# Patient Record
Sex: FEMALE | Race: WHITE | NOT HISPANIC OR LATINO | Employment: FULL TIME | URBAN - METROPOLITAN AREA
[De-identification: names, ages, dates, MRNs, and addresses within clinical notes are randomized per-mention and may not be internally consistent; named-entity substitution may affect disease eponyms.]

---

## 2017-06-08 DIAGNOSIS — O98.511: Primary | ICD-10-CM

## 2017-06-20 ENCOUNTER — OFFICE VISIT (OUTPATIENT)
Dept: MATERNAL FETAL MEDICINE | Facility: CLINIC | Age: 32
End: 2017-06-20
Payer: MEDICAID

## 2017-06-20 VITALS
HEIGHT: 61 IN | WEIGHT: 115.31 LBS | SYSTOLIC BLOOD PRESSURE: 124 MMHG | BODY MASS INDEX: 21.77 KG/M2 | DIASTOLIC BLOOD PRESSURE: 84 MMHG

## 2017-06-20 DIAGNOSIS — O98.419 CHRONIC HEPATITIS C COMPLICATING PREGNANCY, ANTEPARTUM: ICD-10-CM

## 2017-06-20 DIAGNOSIS — Z36.89 ENCOUNTER FOR FETAL ANATOMIC SURVEY: Primary | ICD-10-CM

## 2017-06-20 DIAGNOSIS — Z36.82 ENCOUNTER FOR (NT) NUCHAL TRANSLUCENCY SCAN: ICD-10-CM

## 2017-06-20 DIAGNOSIS — O98.511: ICD-10-CM

## 2017-06-20 DIAGNOSIS — B18.2 CHRONIC HEPATITIS C COMPLICATING PREGNANCY, ANTEPARTUM: ICD-10-CM

## 2017-06-20 PROCEDURE — 76813 OB US NUCHAL MEAS 1 GEST: CPT | Mod: 26,S$PBB,, | Performed by: OBSTETRICS & GYNECOLOGY

## 2017-06-20 PROCEDURE — 99999 PR PBB SHADOW E&M-EST. PATIENT-LVL II: CPT | Mod: PBBFAC,,, | Performed by: OBSTETRICS & GYNECOLOGY

## 2017-06-20 PROCEDURE — 76801 OB US < 14 WKS SINGLE FETUS: CPT | Mod: 26,S$PBB,, | Performed by: OBSTETRICS & GYNECOLOGY

## 2017-06-20 PROCEDURE — 76801 OB US < 14 WKS SINGLE FETUS: CPT | Mod: PBBFAC | Performed by: OBSTETRICS & GYNECOLOGY

## 2017-06-20 PROCEDURE — 99212 OFFICE O/P EST SF 10 MIN: CPT | Mod: PBBFAC | Performed by: OBSTETRICS & GYNECOLOGY

## 2017-06-20 PROCEDURE — 99203 OFFICE O/P NEW LOW 30 MIN: CPT | Mod: 25,S$PBB,TH, | Performed by: OBSTETRICS & GYNECOLOGY

## 2017-06-20 PROCEDURE — 76813 OB US NUCHAL MEAS 1 GEST: CPT | Mod: PBBFAC | Performed by: OBSTETRICS & GYNECOLOGY

## 2017-06-20 RX ORDER — PNV,CALCIUM 72/IRON/FOLIC ACID 27 MG-1 MG
TABLET ORAL
Refills: 0 | COMMUNITY
Start: 2017-05-25 | End: 2018-08-13

## 2017-06-20 NOTE — PROGRESS NOTES
"Indication  ========    consult: Hep c.    History  ======    General History  Other: Hep c  smoker (1/2 pack per day)  Previous Outcomes   2  Para 1  Rene children born living (T) 1  Rene children born (T) 1  Rene living children (L) 1    Maternal Assessment  =================    Weight 52 kg  Weight (lb) 115 lb  BP syst 124 mmHg  BP diast 84 mmHg    Method  ======    Transabdominal ultrasound examination. View: Good view.    Pregnancy  =========    Rene pregnancy. Number of fetuses: 1.    Dating  ======    Cycle: regular cycle  GA by "stated dating" 12 w + 4 d  LORIE by "stated dating": 2017  Ultrasound examination on: 2017  GA by U/S based upon: CRL  GA by U/S 12 w + 6 d  LORIE by U/S: 2017  Assigned: Dating performed on 2017, based on the external assessment  Assigned GA 12 w + 4 d  Assigned LORIE: 2017    General Evaluation  ==============    Cardiac activity: present.  Cord vessels: 3 vessel cord.  Amniotic fluid: normal amount.    Fetal Biometry  ============    CRL 66.0 mm 12w 6d Hadlock  NT 1.2 mm   bpm    Fetal Anatomy  ============    The following structures appear normal:  Cranium. Neck. Thorax. Abdominal wall.    The following structures were visualized:  GI tract. Urogenital tract. Arms. Legs.    Maternal Structures  ===============    Uterus / Cervix  Uterus: Normal  Ovaries / Tubes / Adnexa  Rt ovary: Visualized  Rt ovary D1 2.0 cm  Rt ovary D2 1.7 cm  Rt ovary D3 1.4 cm  Rt ovary mean 1.7 cm  Rt ovary vol 2.5 cm³  Lt ovary: Visualized  Lt ovary D1 2.7 cm  Lt ovary D2 3.0 cm  Lt ovary D3 2.0 cm  Lt ovary mean 2.6 cm  Lt ovary vol 8.7 cm³  Lt ovarian corpus luteum D1 12.0 mm  Lt ovarian corpus luteum D2 9.0 mm  Lt ovarian corpus luteum D3 14.0 mm  Pouch of Edwin / Other Structures  Cul de Sac: Visualized    Consultation  ==========    Gillian is a 31-year-old para 1001 currently at 12 weeks and 4 days gestation with a due date of  " whom we are seen in  consultation at the request of Jon Andersen NP because she was recently discovered to be positive for hepatitis C. she reports that she has  never previously been told that she had hepatitis C and thus this is a new diagnosis. She denies any history of IV drug use or sharing needles.  She has never had a blood transfusion. She has never had a partner that she knows of who is hepatitis C positive. All of her other STD  screening tests were negative including a negative HIV. She does have positive serology for CMV indicating past exposure. Her last pregnancy  was in 2001 and was uncomplicated. She has never seen a GI doctor. She has not had any significant change in nausea or vomiting. She has  never noticed any discoloration of her sclera consistent with jaundice. She has noticed even prior to pregnancy some red spots on her chest  and forearms which appear consistent with small spider angioma. Her past medical history is otherwise notable only for a prior  cholecystectomy.    She is currently a smoker and smokes approximately a half pack per day which is decreased from prior to pregnancy. I encouraged her to  continue to work on decreasing her smoking and I told her that there were a number of resources available including the [place] quit line.  She admits to a past history of inhaled drug use but denies current use. There is no family history of birth defects. She had negative cell free  DNA screening for aneuploidy. The NT measured 1.1 mm at a CRL of 72.5 mm.    I overall spent approximately 40 minutes in face-to-face time with Gillian and her significant other greater than 50% of which were spent in  counseling time regarding the diagnosis of hepatitis C, aneuploidy screening, and smoking cessation. We reviewed her hepatitis C positive  serology as well as the fact that her quantitative PCR identified approximately 140,000 copies of hepatitis C RNA. I also reviewed with her that  the  laboratory studies that were ordered previously revealed a mild elevation of her ALT at 97, but fortunately her total bilirubin was normal at  0.4, her SGOT was normal at 32, her alkaline phosphatase was normal at 60, and her albumin was normal at 4.6. I explained that this may be  indicative of a mild degree of inflammation related to the hepatitis C activity but that in general most women with hepatitis C have improvement  in their LFTs during pregnancy. I also reviewed with her that on exam she has no evidence of icteric sclera or bruising that would suggest other  liver dysfunction. I explained to her the importance of avoiding any potential hepatotoxic agents during pregnancy including medications, such  as acetaminophen, alcohol, or other illicit drugs.    Most women with hepatitis C do well during pregnancy. I explained that in general the pregnancy outcome from most women with hepatitis C  can be favorable although there is an increased risk of adverse outcomes especially related to fetal growth issues and  birth. The extent  to which this is due to hepatitis C versus other exposures such as tobacco use is controversy all however. I explained to her that tobacco use  contributes to both prematurity and fetal growth problems and therefore further encouraged her to quit. One study reported an increased risk of  infants with low birthweight, small for gestational age, need for assisted ventilation, or requirement for  intensive care. HCV-positive  mothers may be at risk for an increased risk of gestational diabetes but most of this risk seems to be limited to those who have significant  hepatic dysfunction.    Improvement in serum aminotransferase concentrations occurs during pregnancy, therefore serial LFTs are not indicated in otherwise stable  patients with Hepatitis C. Although antiviral treatment has been shown effective in eradicating HCV and reducing viral replication, treatment  is  contraindicated during pregnancy. She has never previously seen hepatologist, but I would recommend trying to get her appointed to see one  while she is pregnant so that during the postpartum. There is a plan for follow-up and she can be considered for therapy. The patient was  counseled that the risk of vertical transmission of the virus may occur, but appears to be much less efficient than for hepatitis B, occurring in  about 5 to 10 percent of infants born to women with Hepatitis C. The highest risk seems to be in those with active viral replication, but there is  no role for serial Hepatitis C viral quantification. The risk of infection is approximately threefold higher in infants born to women co-infected with  HCV and HIV. There is no role for elective  in reducing the risk of vertical transmission and there is no reason for any alteration in  usual intrapartum management except for the avoidance of placing a fetal scalp electrode and less absolutely required intrapartum.    Because of the increase in the risk of fetal growth restriction I do recommend serial fetal growth scans beginning at approximately 26 weeks of  gestation and repeated every 4-6 weeks thereafter. I also explained to her that her cell free DNA screening for aneuploidy does not exclude all  congenital malformations and therefore recommend a comprehensive ultrasound evaluation at 18-20 weeks. In addition a maternal serum AFP  without the whole quad screen can be considered at 16-20 weeks to screen for neural tube defects and ventral wall defects.        Impression  =========    Rene living IUP present. Fetal size is consistent with prior dating.    Recommendation  ==============    1. We recommend repeat evaluation for anatomy survey in 6-7 weeks.  2. Serial growth scans beginning at 26 weeks.  3. Referral to GI/hepatology  4. No need for additional LFT or HCV viral count surveillance.  5. No role for elective   6.  Smoking cessation  7. Avoidance of hepatotoxic agents.    Hossein Pfeiffer Jr., MD  Maternal Fetal Medicine

## 2017-06-20 NOTE — LETTER
June 20, 2017      Jon Andersen NP  [address]  [address]  [place] [address]           [facility] - Maternal Fetal [facility]  [address]  [place] [address]  Phone: [phone]          Patient: Gillian Bains   MR Number: [id number]   YOB: 1985   Date of Visit: 6/20/2017       Dear Jon Andersen:    Thank you for referring Gillian Bains to me for evaluation. Attached you will find relevant portions of my assessment and plan of care.    If you have questions, please do not hesitate to call me. I look forward to following Gillian Bains along with you.    Sincerely,    Hossein Pfeiffer MD    Enclosure  CC:  No Recipients    If you would like to receive this communication electronically, please contact [email or web address] or [phone] to request more information on "[employer] Link access.    For providers and/or their staff who would like to refer a patient to [facility], please contact us through our one-stop-shop provider referral line, [facility], at [phone].    If you feel you have received this communication in error or would no longer like to receive these types of communications, please e-mail [email or web address]

## 2017-08-16 ENCOUNTER — OFFICE VISIT (OUTPATIENT)
Dept: MATERNAL FETAL MEDICINE | Facility: CLINIC | Age: 32
End: 2017-08-16
Payer: MEDICAID

## 2017-08-16 DIAGNOSIS — Z36.89 ENCOUNTER FOR ULTRASOUND TO CHECK FETAL GROWTH: Primary | ICD-10-CM

## 2017-08-16 DIAGNOSIS — Z36.89 ULTRASOUND SCAN TO EVALUATE PLACENTA LOCATION: ICD-10-CM

## 2017-08-16 DIAGNOSIS — Z36.89 ENCOUNTER FOR FETAL ANATOMIC SURVEY: ICD-10-CM

## 2017-08-16 PROCEDURE — 76805 OB US >/= 14 WKS SNGL FETUS: CPT | Mod: 26,S$PBB,, | Performed by: OBSTETRICS & GYNECOLOGY

## 2017-08-16 PROCEDURE — 76817 TRANSVAGINAL US OBSTETRIC: CPT | Mod: PBBFAC | Performed by: OBSTETRICS & GYNECOLOGY

## 2017-08-16 PROCEDURE — 99499 UNLISTED E&M SERVICE: CPT | Mod: S$PBB,,, | Performed by: OBSTETRICS & GYNECOLOGY

## 2017-08-16 PROCEDURE — 76817 TRANSVAGINAL US OBSTETRIC: CPT | Mod: 26,S$PBB,, | Performed by: OBSTETRICS & GYNECOLOGY

## 2017-08-16 PROCEDURE — 76805 OB US >/= 14 WKS SNGL FETUS: CPT | Mod: PBBFAC | Performed by: OBSTETRICS & GYNECOLOGY

## 2017-08-16 NOTE — LETTER
August 16, 2017      Hossein Pfeiffer MD  [address] [address]           [facility] - Maternal Fetal Med  [address] [place] [address]  Phone: [phone]          Patient: Gillian Bains   MR Number: [id number]   YOB: 1985   Date of Visit: 8/16/2017       Dear Dr. Hossein Pfeiffer:    Thank you for referring Gillian Bains to me for evaluation. Attached you will find relevant portions of my assessment and plan of care.    If you have questions, please do not hesitate to call me. I look forward to following Gillian Bains along with you.    Sincerely,    Shey Dorman MD    Enclosure  CC:  No Recipients    If you would like to receive this communication electronically, please contact [email or web address] or [phone] to request more information on [employer] Link access.    For providers and/or their staff who would like to refer a patient to [facility], please contact us through our one-stop-shop provider referral line, [facility], at [phone].    If you feel you have received this communication in error or would no longer like to receive these types of communications, please e-mail [email or web address]

## 2017-08-17 NOTE — PROGRESS NOTES
"Indication  ========    Fetal anatomy survey.    History  ======    General History  Other: Hep c  smoker (1/2 pack per day)  Previous Outcomes   2  Para 1  Rene children born living (T) 1  Rene children born (T) 1  Rene living children (L) 1    Method  ======    Transabdominal and transvaginal ultrasound examination. View: Sufficient.    Pregnancy  =========    Rene pregnancy. Number of fetuses: 1.    Dating  ======    Cycle: regular cycle  GA by "stated dating" 20 w + 5 d  LORIE by "stated dating": 2017  Ultrasound examination on: 2017  GA by U/S based upon: AC, BPD, Femur, HC  GA by U/S 21 w + 2 d  LORIE by U/S: 2017  Assigned: Dating performed on 2017, based on the external assessment  Assigned GA 20 w + 5 d  Assigned LORIE: 2017    General Evaluation  ==============    Cardiac activity: present.  bpm.  Fetal movements: visualized.  Presentation: breech.  Placenta: posterior.  Umbilical cord: 3 vessel cord, normal.  Amniotic fluid: normal amount.    Fetal Biometry  ============    Fetal Biometry  BPD 47.5 mm 20w 3d Hadlock  OFD 68.1 mm 22w 5d Marlon  .2 mm 21w 1d Hadlock  .6 mm 22w 0d Hadlock  Femur 36.4 mm 21w 4d Hadlock  Cerebellum tr 23.5 mm 22w 4d Hall  CM 4.9 mm  Nuchal fold 4.53 mm  Humerus 30.1 mm 19w 6d Marlon   g 52% Jai  Calculated by: Hadlock (BPD-HC-AC-FL)  EFW (lb) 1 lb  EFW (oz) 0 oz  Cephalic index 0.70  HC / AC 1.12  FL / BPD 0.77  FL / AC 0.21   bpm  Head / Face / Neck   4.8 mm    Fetal Anatomy  ============    Cranium: normal  Midline falx: normal  Cavum septi pellucidi: normal  Cerebellum: normal  Cisterna magna: normal  Head shape: normal  Rt lateral ventricle: normal  Lt lateral ventricle: normal  Rt choroid plexus: normal  Lt choroid plexus: normal  Parenchyma: normal  Third ventricle: normal  Posterior fossa: normal  Cerebellar lobes: normal  Vermis: normal  Neck: appears normal  Nuchal " fold: normal  Lips: normal  Profile: normal  Nose: normal  Maxilla: normal  Mandible: normal  4-chamber view: normal  RVOT: normal  LVOT: suboptimal  Heart / Thorax: Septal views: normal  Situs: normal  Aortic arch: normal  Ductal arch: normal  SVC: normal  IVC: normal  3-vessel view: normal  3-vessel-trachea view: normal  Cardiac position: normal  Cardiac axis: normal  Cardiac size: normal  Cardiac rhythm: normal  Rt lung: normal  Lt lung: normal  Diaphragm: normal  Cord insertion: normal  Stomach: normal  Bladder: normal  Abdom. wall: appears normal  Abdom. cavity: normal  Rt kidney: normal  Lt kidney: normal  Liver: normal  Bowel: normal  Cervical spine: normal  Thoracic spine: normal  Lumbar spine: normal  Sacral spine: normal  Rt arm: normal  Lt arm: normal  Rt hand: normal  Lt hand: normal  Rt leg: normal  Lt leg: normal  Rt foot: normal  Lt foot: normal  Position of hands: normal  Position of feet: normal  Gender: male  Wants to know gender: yes    Maternal Structures  ===============    Uterus / Cervix  Cervical length 34.3 mm  Other: Uterus and adnexa normal    Impression  =========    Rene live intrauterine pregnancy.  Biometry agrees with the clinical dating.  Amniotic fluid volume is normal by qualitative assessment.  Some of the anatomy was suboptimally visualized. The anatomy that was seen appeared normal.  The placental cord insertion was suboptimally visualized.  Placenta was posterior without previa.  Transvaginal cervical length is normal and placenta is over 2cm from the internal cervical os.    Recommendation  ==============    Follow up ultrasound in 4 weeks to assess fetal growth and suboptimally visualized fetal anatomy.

## 2017-09-14 ENCOUNTER — OFFICE VISIT (OUTPATIENT)
Dept: MATERNAL FETAL MEDICINE | Facility: CLINIC | Age: 32
End: 2017-09-14
Payer: MEDICAID

## 2017-09-14 VITALS — BODY MASS INDEX: 24.2 KG/M2 | SYSTOLIC BLOOD PRESSURE: 104 MMHG | DIASTOLIC BLOOD PRESSURE: 66 MMHG | WEIGHT: 128.06 LBS

## 2017-09-14 DIAGNOSIS — Z36.89 ENCOUNTER FOR ULTRASOUND TO CHECK FETAL GROWTH: ICD-10-CM

## 2017-09-14 DIAGNOSIS — B18.2 CHRONIC HEPATITIS C WITHOUT HEPATIC COMA: ICD-10-CM

## 2017-09-14 PROCEDURE — 99999 PR PBB SHADOW E&M-EST. PATIENT-LVL II: CPT | Mod: PBBFAC,,, | Performed by: OBSTETRICS & GYNECOLOGY

## 2017-09-14 PROCEDURE — 76816 OB US FOLLOW-UP PER FETUS: CPT | Mod: PBBFAC | Performed by: OBSTETRICS & GYNECOLOGY

## 2017-09-14 PROCEDURE — 76816 OB US FOLLOW-UP PER FETUS: CPT | Mod: 26,S$PBB,, | Performed by: OBSTETRICS & GYNECOLOGY

## 2017-09-14 PROCEDURE — 99499 UNLISTED E&M SERVICE: CPT | Mod: S$PBB,,, | Performed by: OBSTETRICS & GYNECOLOGY

## 2017-09-14 PROCEDURE — 99212 OFFICE O/P EST SF 10 MIN: CPT | Mod: PBBFAC | Performed by: OBSTETRICS & GYNECOLOGY

## 2017-09-14 NOTE — LETTER
September 14, 2017      Jon Andersen NP  [address]  [address]  [place] [address]           [facility] - Maternal Fetal Med  [address]  [facility] [address]  Phone: [phone]          Patient: Gillian Bains   MR Number: [id number]   YOB: 1985   Date of Visit: 9/14/2017       Dear Jon Andersen:    Thank you for referring Gillian Bains to me for evaluation. Attached you will find relevant portions of my assessment and plan of care.    If you have questions, please do not hesitate to call me. I look forward to following Gillian Bains along with you.    Sincerely,    Shey Dorman MD    Enclosure  CC:  No Recipients    If you would like to receive this communication electronically, please contact [email or web address] or [phone] to request more information on [employer] Link access.    For providers and/or their staff who would like to refer a patient to [facility], please contact us through our one-stop-shop provider referral line, [facility], at [phone].    If you feel you have received this communication in error or would no longer like to receive these types of communications, please e-mail [email or web address]

## 2017-09-14 NOTE — PROGRESS NOTES
"Indication  ========    Evaluation of fetal growth, Hep C.    History  ======    General History  Other: Hep c  smoker (1/2 pack per day)  Previous Outcomes   2  Para 1  Rene children born living (T) 1  Rene children born (T) 1  Rene living children (L) 1    Maternal Assessment  =================    BP syst 104 mmHg  BP diast 66 mmHg    Method  ======    Transvaginal ultrasound examination, , Voluson E10, Transabdominal ultrasound examination. View: Good view.    Pregnancy  =========    Rene pregnancy. Number of fetuses: 1.    Dating  ======    Cycle: regular cycle  GA by "stated dating" 24 w + 6 d  LORIE by "stated dating": 2017  Ultrasound examination on: 2017  GA by U/S based upon: AC, BPD, Femur, HC  GA by U/S 24 w + 5 d  LORIE by U/S: 2017  Assigned: Dating performed on 2017, based on the external assessment  Assigned GA 24 w + 6 d  Assigned LORIE: 2017    General Evaluation  ==============    Cardiac activity: present.  bpm.  Fetal movements: visualized.  Presentation: breech.  Placenta: posterior.  Umbilical cord: 3 vessel cord.  Amniotic fluid: normal amount.    Fetal Biometry  ============    Fetal Biometry  BPD 59.4 mm 24w 2d Hadlock  OFD 83.5 mm 27w 1d Marlon  .3 mm 25w 0d Hadlock  .6 mm 25w 2d Hadlock  Femur 44.2 mm 24w 4d Hadlock   g 46% Jai  Calculated by: Hadlock (BPD-HC-AC-FL)  EFW (lb) 1 lb  EFW (oz) 10 oz  Cephalic index 0.71  HC / AC 1.11  FL / BPD 0.74  FL / AC 0.21   bpm    Fetal Anatomy  ===========    Cranium: normal  Posterior fossa: normal  4-chamber view: normal  Stomach: normal  Kidneys: normal  Bladder: normal  Gender: male  Wants to know gender: yes  Other: A full anatomy survey previously performed.    Maternal Structures  ===============    Uterus / Cervix  Cervical length 38.9 mm    Impression  =========    Rene live intrauterine pregnancy.  Overall normal fetal growth.  Normal amniotic fluid " volume.  The previously suboptimally visualized fetal anatomy was seen today and appeared normal. The PCI appeared normal.  Transvaginal cervical length was normal.  Patient was informed of ultrasound findings.    Recommendation  ==============    Follow up ultrasound in 6 weeks for growth due to hepatitis C.

## 2017-10-26 ENCOUNTER — OFFICE VISIT (OUTPATIENT)
Dept: MATERNAL FETAL MEDICINE | Facility: CLINIC | Age: 32
End: 2017-10-26
Payer: MEDICAID

## 2017-10-26 DIAGNOSIS — Z36.89 ENCOUNTER FOR ULTRASOUND TO CHECK FETAL GROWTH: ICD-10-CM

## 2017-10-26 PROCEDURE — 99499 UNLISTED E&M SERVICE: CPT | Mod: S$PBB,,, | Performed by: OBSTETRICS & GYNECOLOGY

## 2017-10-26 PROCEDURE — 76816 OB US FOLLOW-UP PER FETUS: CPT | Mod: 26,S$PBB,, | Performed by: OBSTETRICS & GYNECOLOGY

## 2017-10-26 PROCEDURE — 76816 OB US FOLLOW-UP PER FETUS: CPT | Mod: PBBFAC | Performed by: OBSTETRICS & GYNECOLOGY

## 2017-10-26 NOTE — LETTER
October 27, 2017      Jon Andersen NP  [address]  [address]  [place] [address]           [facility] - Maternal Fetal Med  [address]  [facility] [address]  Phone: [phone]          Patient: Gillian Bains   MR Number: [id number]   YOB: 1985   Date of Visit: 10/26/2017       Dear Jon Andersen:    Thank you for referring Gillian Bains to me for evaluation. Attached you will find relevant portions of my assessment and plan of care.    If you have questions, please do not hesitate to call me. I look forward to following Gillian Bains along with you.    Sincerely,    Chari Sifuentes MD    Enclosure  CC:  No Recipients    If you would like to receive this communication electronically, please contact [email or web address] or [phone] to request more information on [employer] Link access.    For providers and/or their staff who would like to refer a patient to [facility], please contact us through our one-stop-shop provider referral line, [facility], at [phone].    If you feel you have received this communication in error or would no longer like to receive these types of communications, please e-mail [email or web address]

## 2017-10-27 NOTE — PROGRESS NOTES
"Indication  ========    Follow-up evaluation for fetal growth, HEP C.    History  ======    General History  Other: Hep c  smoker (1/2 pack per day)    [facility]. [facility],  Previous Outcomes   2  Para 1  Rene children born living (T) 1  Rene children born (T) 1  Rene living children (L) 1    Method  ======    Transabdominal ultrasound examination. View: Sufficient.    Pregnancy  =========    Rene pregnancy. Number of fetuses: 1.    Dating  ======    Cycle: regular cycle  GA by "stated dating" 30 w + 6 d  LORIE by "stated dating": 2017  Ultrasound examination on: 10/26/2017  GA by U/S based upon: AC, BPD, Femur, HC  GA by U/S 31 w + 5 d  LORIE by U/S: 2017  Assigned: Dating performed on 2017, based on the external assessment  Assigned GA 30 w + 6 d  Assigned LORIE: 2017    General Evaluation  ==============    Cardiac activity: present.  bpm.  Fetal movements: visualized.  Presentation: cephalic.  Placenta:  Placental site: posterior.  Umbilical cord: Cord vessels: 3 vessel cord.  Amniotic fluid: Amount of AF: normal amount. MVP 4.6 cm.    Fetal Biometry  ============    Fetal Biometry  BPD 79.3 mm 31w 6d Hadlock  .4 mm 33w 5d Marlon  .2 mm 32w 2d Hadlock  .6 mm 31w 5d Hadlock  Femur 59.4 mm 31w 0d Hadlock  EFW 1,791 g 43% Jai  Calculated by: Hadlock (BPD-HC-AC-FL)  EFW (lb) 3 lb  EFW (oz) 15 oz  Cephalic index 0.77  HC / AC 1.06  FL / BPD 0.75  FL / AC 0.21  MVP 4.6 cm   bpm    Fetal Anatomy  ===========    Cranium: normal  Profile: normal  4-chamber view: normal  LVOT: normal  Stomach: normal  Kidneys: normal  Bladder: normal  Genitals: normal  Gender: male  Wants to know gender: yes    Impression  =========    The interval fetal growth has been appropriate and the EFW plots at the 43rd percentile for gestational age.  The AFV is normal.  The previously suboptimally visualized anatomy was seen " today.    Recommendation  ==============    Recommend repeat growth scan in 4 weeks due to maternal hepatitis C.

## 2017-11-30 ENCOUNTER — OFFICE VISIT (OUTPATIENT)
Dept: MATERNAL FETAL MEDICINE | Facility: CLINIC | Age: 32
End: 2017-11-30
Payer: MEDICAID

## 2017-11-30 DIAGNOSIS — Z36.89 ENCOUNTER FOR ULTRASOUND TO CHECK FETAL GROWTH: ICD-10-CM

## 2017-11-30 DIAGNOSIS — O98.413: ICD-10-CM

## 2017-11-30 DIAGNOSIS — B18.2: ICD-10-CM

## 2017-11-30 PROCEDURE — 99499 UNLISTED E&M SERVICE: CPT | Mod: S$PBB,,, | Performed by: OBSTETRICS & GYNECOLOGY

## 2017-11-30 PROCEDURE — 76816 OB US FOLLOW-UP PER FETUS: CPT | Mod: PBBFAC | Performed by: OBSTETRICS & GYNECOLOGY

## 2017-11-30 PROCEDURE — 76816 OB US FOLLOW-UP PER FETUS: CPT | Mod: 26,S$PBB,, | Performed by: OBSTETRICS & GYNECOLOGY

## 2017-11-30 NOTE — PROGRESS NOTES
"OB Ultrasound Report (see PDF version under imaging tab)    Indication  ========    Follow-up evaluation for fetal growth; maternal hepatitis C.    History  ======    General History  Other: Hep c  smoker (1/2 pack per day)    Ms. Jon Andersen, CNP,  Previous Outcomes   2  Para 1  Rene children born living (T) 1  Rene children born (T) 1  Rene living children (L) 1    Method  ======    Transabdominal ultrasound examination. View: Good view.    Pregnancy  =========    Rene pregnancy. Number of fetuses: 1.    Dating  ======    Cycle: regular cycle  GA by "stated dating" 35 w + 6 d  LORIE by "stated dating": 2017  Ultrasound examination on: 2017  GA by U/S based upon: AC, BPD, Femur, HC  GA by U/S 35 w + 5 d  LORIE by U/S: 2017  Assigned: Dating performed on 2017, based on the external assessment  Assigned GA 35 w + 6 d  Assigned LORIE: 2017    General Evaluation  ===============    Cardiac activity: present.  bpm.  Fetal movements: visualized.  Presentation: cephalic.  Placenta:  Placental site: posterior.  Umbilical cord: Cord vessels: 3 vessel cord.  Amniotic fluid: MVP 5.5 cm.    Fetal Biometry  ===========    Fetal Biometry  BPD 84.8 mm 34w 1d Hadlock  .0 mm  .5 mm 36w 5d Hadlock  .3 mm 36w 4d Hadlock  Femur 69.2 mm 35w 4d Hadlock  CM 5.1 mm  EFW 2,832 g 40% Jai  Calculated by: Hadlock (BPD-HC-AC-FL)  EFW (lb) 6 lb  EFW (oz) 4 oz  Cephalic index 0.73  HC / AC 0.99  FL / BPD 0.82  FL / AC 0.21  MVP 5.5 cm   bpm  Head / Face / Neck   3.1 mm    Fetal Anatomy  ===========    Cranium: normal  Lateral ventricles: normal  Profile: normal  4-chamber view: normal  Stomach: normal  Kidneys: normal  Bladder: normal  Gender: male  Wants to know gender: yes  Other: A full anatomic survey has been previously performed.    Impression  =========    Fetal size is AGA with the EFW at the 40th percentile.  Normal repeat limited fetal " anatomic survey. AFV is normal. Follow-up ultrasound as clinically indicated.

## 2017-12-18 ENCOUNTER — ANESTHESIA (OUTPATIENT)
Dept: OBSTETRICS AND GYNECOLOGY | Facility: OTHER | Age: 32
End: 2017-12-18
Payer: MEDICAID

## 2017-12-18 ENCOUNTER — ANESTHESIA EVENT (OUTPATIENT)
Dept: OBSTETRICS AND GYNECOLOGY | Facility: OTHER | Age: 32
End: 2017-12-18
Payer: MEDICAID

## 2017-12-18 ENCOUNTER — HOSPITAL ENCOUNTER (INPATIENT)
Facility: OTHER | Age: 32
LOS: 2 days | Discharge: HOME | End: 2017-12-20
Attending: OBSTETRICS & GYNECOLOGY | Admitting: OBSTETRICS & GYNECOLOGY
Payer: MEDICAID

## 2017-12-18 DIAGNOSIS — Z86.19 HISTORY OF HEPATITIS C: ICD-10-CM

## 2017-12-18 DIAGNOSIS — Z87.59 STATUS POST VACUUM-ASSISTED VAGINAL DELIVERY: ICD-10-CM

## 2017-12-18 DIAGNOSIS — O47.9 UTERINE CONTRACTIONS DURING PREGNANCY: ICD-10-CM

## 2017-12-18 DIAGNOSIS — O99.333 PREGNANCY COMPLICATED BY TOBACCO USE IN THIRD TRIMESTER: ICD-10-CM

## 2017-12-18 DIAGNOSIS — Z3A.38 38 WEEKS GESTATION OF PREGNANCY: ICD-10-CM

## 2017-12-18 DIAGNOSIS — F19.90: ICD-10-CM

## 2017-12-18 LAB
ABO + RH BLD: NORMAL
ALLENS TEST: ABNORMAL
AMPHET+METHAMPHET UR QL: NEGATIVE
ANISOCYTOSIS BLD QL SMEAR: SLIGHT
BARBITURATES UR QL SCN>200 NG/ML: NEGATIVE
BASOPHILS # BLD AUTO: 0.02 K/UL
BASOPHILS NFR BLD: 0.2 %
BENZODIAZ UR QL SCN>200 NG/ML: NEGATIVE
BLD GP AB SCN CELLS X3 SERPL QL: NORMAL
BZE UR QL SCN: NEGATIVE
CANNABINOIDS UR QL SCN: NEGATIVE
CREAT UR-MCNC: 41.5 MG/DL
DIFFERENTIAL METHOD: ABNORMAL
EOSINOPHIL # BLD AUTO: 0.1 K/UL
EOSINOPHIL NFR BLD: 0.7 %
ERYTHROCYTE [DISTWIDTH] IN BLOOD BY AUTOMATED COUNT: 13.1 %
ETHANOL UR-MCNC: <10 MG/DL
GIANT PLATELETS BLD QL SMEAR: PRESENT
HCO3 UR-SCNC: 23.7 MMOL/L (ref 24–28)
HCT VFR BLD AUTO: 36.2 %
HGB BLD-MCNC: 12.5 G/DL
LYMPHOCYTES # BLD AUTO: 2.6 K/UL
LYMPHOCYTES NFR BLD: 21.9 %
MCH RBC QN AUTO: 31.1 PG
MCHC RBC AUTO-ENTMCNC: 34.5 G/DL
MCV RBC AUTO: 90 FL
METHADONE UR QL SCN>300 NG/ML: NEGATIVE
MONOCYTES # BLD AUTO: 0.5 K/UL
MONOCYTES NFR BLD: 4.4 %
NEUTROPHILS # BLD AUTO: 8.5 K/UL
NEUTROPHILS NFR BLD: 72.8 %
OPIATES UR QL SCN: NORMAL
PCO2 BLDA: 50.7 MMHG (ref 35–45)
PCP UR QL SCN>25 NG/ML: NEGATIVE
PH SMN: 7.28 [PH] (ref 7.35–7.45)
PLATELET # BLD AUTO: 379 K/UL
PLATELET BLD QL SMEAR: ABNORMAL
PMV BLD AUTO: 9.6 FL
PO2 BLDA: 23 MMHG (ref 80–100)
POC BE: -3 MMOL/L
POC SATURATED O2: 31 % (ref 95–100)
RBC # BLD AUTO: 4.02 M/UL
RPR SER QL: NORMAL
SAMPLE: ABNORMAL
SITE: ABNORMAL
TOXICOLOGY INFORMATION: NORMAL
WBC # BLD AUTO: 11.74 K/UL

## 2017-12-18 PROCEDURE — 27800517 HC TRAY,EPIDURAL-CONTINUOUS: Performed by: ANESTHESIOLOGY

## 2017-12-18 PROCEDURE — 59025 FETAL NON-STRESS TEST: CPT

## 2017-12-18 PROCEDURE — 72100002 HC LABOR CARE, 1ST 8 HOURS

## 2017-12-18 PROCEDURE — 25000003 PHARM REV CODE 250: Performed by: STUDENT IN AN ORGANIZED HEALTH CARE EDUCATION/TRAINING PROGRAM

## 2017-12-18 PROCEDURE — 86592 SYPHILIS TEST NON-TREP QUAL: CPT

## 2017-12-18 PROCEDURE — 99285 EMERGENCY DEPT VISIT HI MDM: CPT | Mod: 25

## 2017-12-18 PROCEDURE — 59025 FETAL NON-STRESS TEST: CPT | Mod: 26,,, | Performed by: OBSTETRICS & GYNECOLOGY

## 2017-12-18 PROCEDURE — 51702 INSERT TEMP BLADDER CATH: CPT

## 2017-12-18 PROCEDURE — 85025 COMPLETE CBC W/AUTO DIFF WBC: CPT

## 2017-12-18 PROCEDURE — 62326 NJX INTERLAMINAR LMBR/SAC: CPT | Performed by: ANESTHESIOLOGY

## 2017-12-18 PROCEDURE — 59409 OBSTETRICAL CARE: CPT | Mod: AA,,, | Performed by: ANESTHESIOLOGY

## 2017-12-18 PROCEDURE — 82803 BLOOD GASES ANY COMBINATION: CPT

## 2017-12-18 PROCEDURE — 99283 EMERGENCY DEPT VISIT LOW MDM: CPT | Mod: 25,,, | Performed by: OBSTETRICS & GYNECOLOGY

## 2017-12-18 PROCEDURE — 63600175 PHARM REV CODE 636 W HCPCS: Performed by: ANESTHESIOLOGY

## 2017-12-18 PROCEDURE — 99900035 HC TECH TIME PER 15 MIN (STAT)

## 2017-12-18 PROCEDURE — 11000001 HC ACUTE MED/SURG PRIVATE ROOM

## 2017-12-18 PROCEDURE — 86901 BLOOD TYPING SEROLOGIC RH(D): CPT

## 2017-12-18 PROCEDURE — 86703 HIV-1/HIV-2 1 RESULT ANTBDY: CPT

## 2017-12-18 PROCEDURE — 25000003 PHARM REV CODE 250: Performed by: ANESTHESIOLOGY

## 2017-12-18 PROCEDURE — 86900 BLOOD TYPING SEROLOGIC ABO: CPT

## 2017-12-18 PROCEDURE — 36416 COLLJ CAPILLARY BLOOD SPEC: CPT

## 2017-12-18 PROCEDURE — 63600175 PHARM REV CODE 636 W HCPCS: Performed by: STUDENT IN AN ORGANIZED HEALTH CARE EDUCATION/TRAINING PROGRAM

## 2017-12-18 PROCEDURE — 59409 OBSTETRICAL CARE: CPT | Mod: AT,,, | Performed by: OBSTETRICS & GYNECOLOGY

## 2017-12-18 PROCEDURE — 80307 DRUG TEST PRSMV CHEM ANLYZR: CPT

## 2017-12-18 PROCEDURE — 27200710 HC EPIDURAL INFUSION PUMP SET: Performed by: ANESTHESIOLOGY

## 2017-12-18 RX ORDER — IBUPROFEN 600 MG/1
600 TABLET ORAL EVERY 6 HOURS PRN
Status: DISCONTINUED | OUTPATIENT
Start: 2017-12-18 | End: 2017-12-19

## 2017-12-18 RX ORDER — FENTANYL/BUPIVACAINE/NS/PF 2MCG/ML-.1
PLASTIC BAG, INJECTION (ML) INJECTION CONTINUOUS
Status: DISCONTINUED | OUTPATIENT
Start: 2017-12-18 | End: 2017-12-20 | Stop reason: HOSPADM

## 2017-12-18 RX ORDER — METHYLERGONOVINE MALEATE 0.2 MG/ML
INJECTION INTRAVENOUS
Status: DISCONTINUED
Start: 2017-12-18 | End: 2017-12-19 | Stop reason: WASHOUT

## 2017-12-18 RX ORDER — SODIUM CITRATE AND CITRIC ACID MONOHYDRATE 334; 500 MG/5ML; MG/5ML
30 SOLUTION ORAL ONCE
Status: DISCONTINUED | OUTPATIENT
Start: 2017-12-18 | End: 2017-12-20 | Stop reason: HOSPADM

## 2017-12-18 RX ORDER — BUPIVACAINE HYDROCHLORIDE 2.5 MG/ML
INJECTION, SOLUTION EPIDURAL; INFILTRATION; INTRACAUDAL
Status: DISPENSED
Start: 2017-12-18 | End: 2017-12-19

## 2017-12-18 RX ORDER — LIDOCAINE HYDROCHLORIDE AND EPINEPHRINE 15; 5 MG/ML; UG/ML
INJECTION, SOLUTION EPIDURAL
Status: DISCONTINUED | OUTPATIENT
Start: 2017-12-18 | End: 2017-12-18

## 2017-12-18 RX ORDER — CARBOPROST TROMETHAMINE 250 UG/ML
INJECTION, SOLUTION INTRAMUSCULAR
Status: DISCONTINUED
Start: 2017-12-18 | End: 2017-12-19 | Stop reason: WASHOUT

## 2017-12-18 RX ORDER — ONDANSETRON 8 MG/1
8 TABLET, ORALLY DISINTEGRATING ORAL EVERY 8 HOURS PRN
Status: DISCONTINUED | OUTPATIENT
Start: 2017-12-18 | End: 2017-12-19

## 2017-12-18 RX ORDER — OXYTOCIN/RINGER'S LACTATE 20/1000 ML
2 PLASTIC BAG, INJECTION (ML) INTRAVENOUS CONTINUOUS
Status: DISCONTINUED | OUTPATIENT
Start: 2017-12-18 | End: 2017-12-19

## 2017-12-18 RX ORDER — SODIUM CHLORIDE, SODIUM LACTATE, POTASSIUM CHLORIDE, CALCIUM CHLORIDE 600; 310; 30; 20 MG/100ML; MG/100ML; MG/100ML; MG/100ML
INJECTION, SOLUTION INTRAVENOUS CONTINUOUS
Status: DISCONTINUED | OUTPATIENT
Start: 2017-12-18 | End: 2017-12-19

## 2017-12-18 RX ORDER — MISOPROSTOL 200 UG/1
600 TABLET ORAL
Status: DISCONTINUED | OUTPATIENT
Start: 2017-12-18 | End: 2017-12-19

## 2017-12-18 RX ORDER — FAMOTIDINE 10 MG/ML
20 INJECTION INTRAVENOUS ONCE
Status: DISCONTINUED | OUTPATIENT
Start: 2017-12-18 | End: 2017-12-20 | Stop reason: HOSPADM

## 2017-12-18 RX ORDER — FENTANYL CITRATE 50 UG/ML
INJECTION, SOLUTION INTRAMUSCULAR; INTRAVENOUS
Status: COMPLETED
Start: 2017-12-18 | End: 2017-12-18

## 2017-12-18 RX ORDER — FENTANYL CITRATE 50 UG/ML
INJECTION, SOLUTION INTRAMUSCULAR; INTRAVENOUS
Status: DISCONTINUED | OUTPATIENT
Start: 2017-12-18 | End: 2017-12-18

## 2017-12-18 RX ORDER — FENTANYL/BUPIVACAINE/NS/PF 2MCG/ML-.1
PLASTIC BAG, INJECTION (ML) INJECTION
Status: DISPENSED
Start: 2017-12-18 | End: 2017-12-19

## 2017-12-18 RX ORDER — FENTANYL/BUPIVACAINE/NS/PF 2MCG/ML-.1
PLASTIC BAG, INJECTION (ML) INJECTION CONTINUOUS PRN
Status: DISCONTINUED | OUTPATIENT
Start: 2017-12-18 | End: 2017-12-18

## 2017-12-18 RX ORDER — SODIUM CHLORIDE 9 MG/ML
INJECTION, SOLUTION INTRAVENOUS
Status: DISCONTINUED | OUTPATIENT
Start: 2017-12-18 | End: 2017-12-19

## 2017-12-18 RX ADMIN — SODIUM CHLORIDE, SODIUM LACTATE, POTASSIUM CHLORIDE, AND CALCIUM CHLORIDE 1000 ML: .6; .31; .03; .02 INJECTION, SOLUTION INTRAVENOUS at 03:12

## 2017-12-18 RX ADMIN — IBUPROFEN 600 MG: 600 TABLET, FILM COATED ORAL at 11:12

## 2017-12-18 RX ADMIN — FENTANYL CITRATE 100 MCG: 50 INJECTION, SOLUTION INTRAMUSCULAR; INTRAVENOUS at 03:12

## 2017-12-18 RX ADMIN — Medication 5 ML: at 03:12

## 2017-12-18 RX ADMIN — LIDOCAINE HYDROCHLORIDE,EPINEPHRINE BITARTRATE 3 ML: 15; .005 INJECTION, SOLUTION EPIDURAL; INFILTRATION; INTRACAUDAL; PERINEURAL at 03:12

## 2017-12-18 RX ADMIN — SODIUM CHLORIDE, SODIUM LACTATE, POTASSIUM CHLORIDE, AND CALCIUM CHLORIDE: .6; .31; .03; .02 INJECTION, SOLUTION INTRAVENOUS at 01:12

## 2017-12-18 RX ADMIN — Medication 2 MILLI-UNITS/MIN: at 03:12

## 2017-12-18 RX ADMIN — Medication 10 ML/HR: at 03:12

## 2017-12-18 NOTE — HOSPITAL COURSE
"12/18/2017 - Patient admitted for expectant labor management.  12/19/2017 - PPD #1 s/p VAVD of male infant. Patient with Hep C, tobacco use during pregnancy, and urine positive for opiates. She is doing well this morning from a PP standpoint. She was asked about opiate use and says she took percocet occasionally ("left over from something else") for pregnancy-related pain. She denies other drug use. Infant's utox negative. She is bottle feeding, and desires circumcision.  12/20/2017 PPD#2 s/p VAVD, meeting all postpartum milestones, stable for dc.  "

## 2017-12-18 NOTE — HPI
Gillian Bains is a 32 y.o.  at 38w3d who presents complaining of painful uterine contractions since 3:30 AM.  The patient states that they have increased in intensity and frequency since that time. She denies any leaking fluids or vaginal bleeding and states that fetal movements are active and persistent.     In the OB ED she was found to have a reactive strip and her cervical check was 4/90/-1.  Given her cervical dilation and painful contractions she was admitted to the floor for expectant labor management.     This IUP is complicated by daily tobacco use, maternal Hep C infection.

## 2017-12-18 NOTE — H&P
[facility]  Obstetrics  History & Physical    Patient Name: Gillian Bains  MRN: [id number]  Admission Date: 2017  Primary Care Provider: Jon Andersen NP    Subjective:     Principal Problem:<principal problem not specified>    History of Present Illness:  Gillian Bains is a 32 y.o.  at 38w3d who presents complaining of painful uterine contractions since 3:30 AM.  The patient states that they have increased in intensity and frequency since that time. She denies any leaking fluids or vaginal bleeding and states that fetal movements are active and persistent.     In the OB ED she was found to have a reactive strip and her cervical check was /-1.  Given her cervical dilation and painful contractions she was admitted to the floor for expectant labor management.     This IUP is complicated by daily tobacco use, maternal Hep C infection.           Obstetric History       T1      L1     SAB0   TAB0   Ectopic0   Multiple0   Live Births1       # Outcome Date GA Lbr [place]/2nd Weight Sex Delivery Anes PTL Lv   2 Current            1 Term  40w0d   M Vag-Spont  Y CORA      Name: Miko        Past Medical History:   Diagnosis Date    Hepatitis C      Past Surgical History:   Procedure Laterality Date    CHOLECYSTECTOMY         PTA Medications   Medication Sig    PREPLUS 27 mg iron- 1 mg Tab TK ONE T PO D       Review of patient's allergies indicates:  No Known Allergies     Family History     None        Social History Main Topics    Smoking status: Current Every Day Smoker     Packs/day: 0.50    Smokeless tobacco: Never Used    Alcohol use No    Drug use: No    Sexual activity: Yes     Partners: Male     Review of Systems   Constitutional: Negative for chills and fever.   Eyes: Negative for visual disturbance.   Respiratory: Negative for shortness of breath.    Cardiovascular: Negative for chest pain.   Gastrointestinal: Positive for abdominal pain (With Contractions).  Negative for diarrhea, nausea and vomiting.   Genitourinary: Negative for dysuria, hematuria, vaginal bleeding and vaginal discharge.   Musculoskeletal: Positive for back pain (With Contractions).   Skin:  Negative for rash.   Neurological: Negative for headaches.   Psychiatric/Behavioral: Negative.       Objective:     VSS    FHT: 130, moderate variability, accels present, no decells, Category 1 - Reassuring  TOCO:  Irregular     Physical Exam:   Constitutional: She is oriented to person, place, and time. She appears well-developed and well-nourished. No distress.    HENT:   Head: Normocephalic and atraumatic.    Eyes: Conjunctivae are normal.    Neck: Neck supple.    Cardiovascular: Normal rate, regular rhythm and intact distal pulses.     Pulmonary/Chest: Effort normal. No respiratory distress.        Abdominal: She exhibits distension (Gravid uterus). There is no rebound and no guarding.                 Neurological: She is alert and oriented to person, place, and time.    Skin: Skin is warm and dry. She is not diaphoretic.    Psychiatric: She has a normal mood and affect. Her behavior is normal. Judgment and thought content normal.       Cervix:  Dilation:  4  Effacement:  90%  Station: -1  Presentation: Vertex     Significant Labs:  No results found for: GROUPTRH, HEPBSAG, RUBELLAIGGSC, STREPBCULT, AFP, [id number]    I have personallly reviewed all pertinent lab results from the last 24 hours.    Assessment/Plan:     32 y.o. female  at 38w3d for:    Indication for care in labor and delivery, antepartum    - Consents signed and to chart  - Admit to Labor and Delivery unit  - Plan for expectant for labor management, will augment as needed    - Epidural per Anesthesia  - Draw CBC, T&S  - Ultrasound performed, fetus in vertex position.  - Recheck in 2 hrs or PRN   - Post-Partum Hemorrhage risk - low                  Blaise Shahid MD  Obstetrics  [facility]    I have reviewed the  resident's note,and agree with the diagnosis and management plan.

## 2017-12-18 NOTE — ED PROVIDER NOTES
"Encounter Date: 2017       History     Chief Complaint   Patient presents with    Contractions     Gillian Bains is a 32 y.o.  at 38w3d who presents complaining of painful uterine contractions since 3:30 AM.  The patient states that they have increased in intensity and frequency since that time. She denies any leaking fluids or vaginal bleeding and states that fetal movements are active and persistent. The patient denies any other complaints at this time apart from those listed above.     This IUP is complicated by daily tobacco use and maternal Hep C infection.            Review of patient's allergies indicates:  No Known Allergies  Past Medical History:   Diagnosis Date    Hepatitis C      Past Surgical History:   Procedure Laterality Date    CHOLECYSTECTOMY       No family history on file.  Social History   Substance Use Topics    Smoking status: Current Every Day Smoker     Packs/day: 0.50    Smokeless tobacco: Never Used    Alcohol use No     Review of Systems   Constitutional: Negative for fever.   HENT: Negative for sore throat.    Eyes: Negative for visual disturbance.   Respiratory: Negative for shortness of breath.    Cardiovascular: Negative for chest pain.   Gastrointestinal: Positive for abdominal pain (Contractions). Negative for diarrhea, nausea and vomiting.   Genitourinary: Negative for dysuria.   Musculoskeletal: Positive for back pain (With Contractions).   Skin: Negative for rash.   Neurological: Negative for weakness and headaches.   Hematological: Does not bruise/bleed easily.       Physical Exam     Vitals:    17 1356 17 1401 17 1406 17 1407   BP:    (!) 108/56   Pulse: 84 83 86 82   Resp:       Temp:       SpO2: 100% 99% 100%    Weight:    62.6 kg (138 lb)   Height:    5' 1" (1.549 m)       Physical Exam    Vitals reviewed.  Constitutional: She appears well-developed and well-nourished. She is not diaphoretic. No distress.   HENT:   Head: " Normocephalic and atraumatic.   Eyes: Conjunctivae are normal.   Neck: Neck supple.   Cardiovascular: Normal rate, regular rhythm and intact distal pulses.   Pulmonary/Chest: Breath sounds normal. No respiratory distress.   Abdominal: She exhibits distension (Gravid uterus). There is no tenderness. There is no rebound and no guarding.   Genitourinary: Vagina normal.   Neurological: She is alert and oriented to person, place, and time.   Skin: Skin is warm and dry.   Psychiatric: She has a normal mood and affect. Her behavior is normal. Judgment and thought content normal.     OB LABOR EXAM:   Pre-Term Labor: No.   Membranes ruptured: No.   Method: Sterile vaginal exam per MD.   Vaginal Bleeding: bloody show.     Dilatation: 4.   Station: -1.   Effacement: 90%.   Amniotic Fluid Color: no fluid.           ED Course   Procedures  Labs Reviewed - No data to display     Fetal Nonstress Test:   FHR: 130s  + accelerations, no declerations; moderate variability  [place]: irregular contractions  Assessment: reactive NST     Medical Decision Making:   ED Management:  SVE - 4/90/-1  NST - Reactive    MDM:  Patient's limited prenatal records in our system were reviewed. She brought with her Prenatal Labs from the NP that she has been seeing for Prenatal care. GBS is negative. HIV, RPR &  Hep B were negative in the first trimester.                  ED Course      Clinical Impression:   The primary encounter diagnosis was Indication for care in labor and delivery, antepartum. Diagnoses of Uterine contractions during pregnancy, 38 weeks gestation of pregnancy, Pregnancy complicated by tobacco use in third trimester, and History of hepatitis C were also pertinent to this visit.     - Will admit patient to L&D for expectant and/or active labor management    Ashlyn Fernandez M.D.  PGY1 OB/GYN          Ashlyn Potts MD  Resident  12/18/17 6567  The patient has been seen and examined by me, and I agree with the Resident assessment and plan  as above.      Audelia Zhu MD  12/18/17 4115

## 2017-12-18 NOTE — PROGRESS NOTES
"LABOR NOTE    Gillian Bains is a 32 y.o. who presented in labor.   S:  Complaints: No.  Epidural working:  Yes.    O: /60   Pulse 93   Temp 97 °F (36.1 °C)   Resp 18   Ht 5' 1" (1.549 m)   Wt 62.6 kg (138 lb)   SpO2 100%   Breastfeeding? No   BMI 26.07 kg/m²     FHT: baseline 135 Cat 1 (reassuring), mod btbv, accelerations, no decels  CTX: q 3-8 minutes  SVE: 5/90/-1, AROM clear, bloody show    ASSESSMENT:   32 y.o.  at 38w3d, latent labor. Will augment with pitocin.    FHT reassuring    Active Hospital Problems    Diagnosis  POA    Uterine contractions during pregnancy [O62.2]  Yes    Indication for care in labor and delivery, antepartum [O75.9]  Unknown      Resolved Hospital Problems    Diagnosis Date Resolved POA   No resolved problems to display.     PLAN:    Latent labor  AROM at 15:30  Continue Close Maternal/Fetal Monitoring  Start pitocin Augmentation per protocol  Recheck 2 hours or PRN    Blaise Shahid M.D.  Obstetrics & Gynecology  PGY-1      "

## 2017-12-18 NOTE — ASSESSMENT & PLAN NOTE
- Consents signed and to chart  - Admit to Labor and Delivery unit  - Plan for expectant for labor management, will augment as needed    - Epidural per Anesthesia  - Draw CBC, T&S  - Ultrasound performed, fetus in vertex position.  - Recheck in 2 hrs or PRN   - Post-Partum Hemorrhage risk - low

## 2017-12-18 NOTE — SUBJECTIVE & OBJECTIVE
Obstetric History       T1      L1     SAB0   TAB0   Ectopic0   Multiple0   Live Births1       # Outcome Date GA Lbr [place]/2nd Weight Sex Delivery Anes PTL Lv   2 Current            1 Term  40w0d   M Vag-Spont  Y CORA      Name: Miko        Past Medical History:   Diagnosis Date    Hepatitis C      Past Surgical History:   Procedure Laterality Date    CHOLECYSTECTOMY         PTA Medications   Medication Sig    PREPLUS 27 mg iron- 1 mg Tab TK ONE T PO D       Review of patient's allergies indicates:  No Known Allergies     Family History     None        Social History Main Topics    Smoking status: Current Every Day Smoker     Packs/day: 0.50    Smokeless tobacco: Never Used    Alcohol use No    Drug use: No    Sexual activity: Yes     Partners: Male     Review of Systems   Constitutional: Negative for chills and fever.   Eyes: Negative for visual disturbance.   Respiratory: Negative for shortness of breath.    Cardiovascular: Negative for chest pain.   Gastrointestinal: Positive for abdominal pain (With Contractions). Negative for diarrhea, nausea and vomiting.   Genitourinary: Negative for dysuria, hematuria, vaginal bleeding and vaginal discharge.   Musculoskeletal: Positive for back pain (With Contractions).   Skin:  Negative for rash.   Neurological: Negative for headaches.   Psychiatric/Behavioral: Negative.       Objective:     VSS    FHT: 130, moderate variability, accels present, no decells, Category 1 - Reassuring  TOCO:  Irregular     Physical Exam:   Constitutional: She is oriented to person, place, and time. She appears well-developed and well-nourished. No distress.    HENT:   Head: Normocephalic and atraumatic.    Eyes: Conjunctivae are normal.    Neck: Neck supple.    Cardiovascular: Normal rate, regular rhythm and intact distal pulses.     Pulmonary/Chest: Effort normal. No respiratory distress.        Abdominal: She exhibits distension (Gravid uterus). There is no  rebound and no guarding.                 Neurological: She is alert and oriented to person, place, and time.    Skin: Skin is warm and dry. She is not diaphoretic.    Psychiatric: She has a normal mood and affect. Her behavior is normal. Judgment and thought content normal.       Cervix:  Dilation:  4  Effacement:  90%  Station: -1  Presentation: Vertex     Significant Labs:  No results found for: GROUPTRH, HEPBSAG, RUBELLAIGGSC, STREPBCULT, AFP, [id number]    I have personallly reviewed all pertinent lab results from the last 24 hours.

## 2017-12-18 NOTE — ANESTHESIA PREPROCEDURE EVALUATION
2017  Gillian Bains is a 32 y.o., female  at 38w3d who presents complaining of painful uterine contractions since 3:30 AM. This IUP is complicated by daily tobacco use, maternal Hep C infection.      OB History      Para Term  AB Living    2 1 1     1    SAB TAB Ectopic Multiple Live Births            1          Patient Active Problem List   Diagnosis    Uterine contractions during pregnancy       Review of patient's allergies indicates:  No Known Allergies     No current facility-administered medications on file prior to encounter.      Current Outpatient Prescriptions on File Prior to Encounter   Medication Sig Dispense Refill    PREPLUS 27 mg iron- 1 mg Tab TK ONE T PO D  0       Past Surgical History:   Procedure Laterality Date    CHOLECYSTECTOMY         Social History     Social History    Marital status: Single     Spouse name: N/A    Number of children: N/A    Years of education: N/A     Occupational History    Not on file.     Social History Main Topics    Smoking status: Current Every Day Smoker     Packs/day: 0.50    Smokeless tobacco: Never Used    Alcohol use No    Drug use: No    Sexual activity: Yes     Partners: Male     Other Topics Concern    Not on file     Social History Narrative    No narrative on file         Vital Signs Range (Last 24H):         CBC: No results for input(s): WBC, RBC, HGB, HCT, PLT, MCV, MCH, MCHC in the last 72 hours.    CMP: No results for input(s): NA, K, CL, CO2, BUN, CREATININE, GLU, MG, PHOS, CALCIUM, ALBUMIN, PROT, ALKPHOS, ALT, AST, BILITOT in the last 72 hours.    INR  No results for input(s): PT, INR, PROTIME, APTT in the last 72 hours.        Anesthesia Evaluation    I have reviewed the Patient Summary Reports.    I have reviewed the Nursing Notes.   I have reviewed the Medications.     Review of Systems  Anesthesia  Hx:  No problems with previous Anesthesia  History of prior surgery of interest to airway management or planning: Denies Family Hx of Anesthesia complications.   Denies Personal Hx of Anesthesia complications.   Social:  Smoker    Hematology/Oncology:  Hematology Normal   Oncology Normal     EENT/Dental:EENT/Dental Normal   Cardiovascular:  Cardiovascular Normal     Pulmonary:  Pulmonary Normal    Renal/:  Renal/ Normal     Hepatic/GI:   Hepatitis, C    Musculoskeletal:  Musculoskeletal Normal    Neurological:  Neurology Normal    Endocrine:  Endocrine Normal    Dermatological:  Skin Normal    Psych:  Psychiatric Normal           Physical Exam  General:  Well nourished    Airway/Jaw/Neck:  Airway Findings: Mouth Opening: Normal Tongue: Normal  General Airway Assessment: Adult  Mallampati: III  Improves to II with phonation.  TM Distance: Normal, at least 6 cm      Dental:  Dental Findings: In tact   Chest/Lungs:  Chest/Lungs Findings: Clear to auscultation, Normal Respiratory Rate     Heart/Vascular:  Heart Findings: Rate: Normal  Rhythm: Regular Rhythm  Sounds: Normal        Mental Status:  Mental Status Findings:  Cooperative, Alert and Oriented         Anesthesia Plan  Type of Anesthesia, risks & benefits discussed:  Anesthesia Type:  CSE, epidural, general, spinal  Patient's Preference:   Intra-op Monitoring Plan:   Intra-op Monitoring Plan Comments:   Post Op Pain Control Plan:   Post Op Pain Control Plan Comments:   Induction:   IV  Beta Blocker:  Patient is not currently on a Beta-Blocker (No further documentation required).       Informed Consent: Patient understands risks and agrees with Anesthesia plan.  Questions answered. Anesthesia consent signed with patient.  ASA Score: 2     Day of Surgery Review of History & Physical:    H&P update referred to the surgeon.         Ready For Surgery From Anesthesia Perspective.

## 2017-12-18 NOTE — ANESTHESIA PROCEDURE NOTES
Epidural    Patient location during procedure: OB   Reason for block: primary anesthetic   Diagnosis: IUP   Start time: 12/18/2017 3:01 PM  Timeout: 12/18/2017 3:00 PM  End time: 12/18/2017 3:20 PM  Staffing  Anesthesiologist: KIRA JACOBSON  Resident/CRNA: OWEN JUNIOR  Performed: resident/CRNA   Preanesthetic Checklist  Completed: patient identified, surgical consent, pre-op evaluation, timeout performed, IV checked, risks and benefits discussed, monitors and equipment checked, anesthesia consent given, hand hygiene performed and patient being monitored  Preparation  Patient position: sitting  Prep: ChloraPrep  Patient monitoring: Pulse Ox and Blood Pressure  Epidural  Skin Anesthetic: lidocaine 1%  Skin Wheal: 3 mL  Administration type: continuous  Approach: midline  Interspace: L3-4  Injection technique: GURMEET saline  Needle and Epidural Catheter  Needle type: Tuohy   Needle gauge: 18  Needle length: 3.5 inches  Needle insertion depth: 6 cm  Catheter type: springwound and multi-orifice  Catheter size: 19 G  Catheter at skin depth: 10 cm  Test dose: 3 mL of lidocaine 1.5% with Epi 1-to-200,000  Additional Documentation: incremental injection, negative aspiration for heme and CSF, no paresthesia on injection, no signs/symptoms of IV or SA injection, no significant complaints from patient and no significant pain on injection  Needle localization: anatomical landmarks  Medications:  Bolus administered: 10 mL of 0.125% bupivacaine  Opioid administered: 100 mcg of   fentanyl  Volume per aspiration: 5 mL  Time between aspirations: 5 minutes  Assessment  Lower dermatomal level: T6   Dermatomal levels determined by alcohol wipe  Ease of block: easy  Patient's tolerance of the procedure: comfortable throughout block  Post dural Puncture Headache?: No

## 2017-12-19 PROBLEM — F19.90 PREGNANCY COMPLICATED BY MATERNAL DRUG USE, DELIVERED, CURRENT HOSPITALIZATION: Status: ACTIVE | Noted: 2017-12-19

## 2017-12-19 PROBLEM — O99.333 PREGNANCY COMPLICATED BY TOBACCO USE IN THIRD TRIMESTER: Status: ACTIVE | Noted: 2017-12-19

## 2017-12-19 PROBLEM — O47.9 UTERINE CONTRACTIONS DURING PREGNANCY: Status: RESOLVED | Noted: 2017-12-18 | Resolved: 2017-12-19

## 2017-12-19 PROBLEM — Z87.59 STATUS POST VACUUM-ASSISTED VAGINAL DELIVERY: Status: ACTIVE | Noted: 2017-12-19

## 2017-12-19 LAB
BASOPHILS # BLD AUTO: ABNORMAL K/UL
BASOPHILS NFR BLD: 1 %
DIFFERENTIAL METHOD: ABNORMAL
EOSINOPHIL # BLD AUTO: ABNORMAL K/UL
EOSINOPHIL NFR BLD: 0 %
ERYTHROCYTE [DISTWIDTH] IN BLOOD BY AUTOMATED COUNT: 13.4 %
HCT VFR BLD AUTO: 33.9 %
HGB BLD-MCNC: 11.4 G/DL
HIV 1+2 AB+HIV1 P24 AG SERPL QL IA: NEGATIVE
LYMPHOCYTES # BLD AUTO: ABNORMAL K/UL
LYMPHOCYTES NFR BLD: 15 %
MCH RBC QN AUTO: 30.7 PG
MCHC RBC AUTO-ENTMCNC: 33.6 G/DL
MCV RBC AUTO: 91 FL
MONOCYTES # BLD AUTO: ABNORMAL K/UL
MONOCYTES NFR BLD: 5 %
NEUTROPHILS NFR BLD: 79 %
PLATELET # BLD AUTO: 341 K/UL
PLATELET BLD QL SMEAR: ABNORMAL
PMV BLD AUTO: 9.3 FL
RBC # BLD AUTO: 3.71 M/UL
WBC # BLD AUTO: 17.65 K/UL

## 2017-12-19 PROCEDURE — 85027 COMPLETE CBC AUTOMATED: CPT

## 2017-12-19 PROCEDURE — 99233 SBSQ HOSP IP/OBS HIGH 50: CPT | Mod: ,,, | Performed by: OBSTETRICS & GYNECOLOGY

## 2017-12-19 PROCEDURE — 72200004 HC VAGINAL DELIVERY LEVEL I

## 2017-12-19 PROCEDURE — 11000001 HC ACUTE MED/SURG PRIVATE ROOM

## 2017-12-19 PROCEDURE — 36415 COLL VENOUS BLD VENIPUNCTURE: CPT

## 2017-12-19 PROCEDURE — 25000003 PHARM REV CODE 250: Performed by: STUDENT IN AN ORGANIZED HEALTH CARE EDUCATION/TRAINING PROGRAM

## 2017-12-19 PROCEDURE — 85007 BL SMEAR W/DIFF WBC COUNT: CPT

## 2017-12-19 RX ORDER — HYDROCORTISONE 25 MG/G
CREAM TOPICAL 3 TIMES DAILY PRN
Status: DISCONTINUED | OUTPATIENT
Start: 2017-12-19 | End: 2017-12-20 | Stop reason: HOSPADM

## 2017-12-19 RX ORDER — DIPHENHYDRAMINE HCL 25 MG
25 CAPSULE ORAL EVERY 4 HOURS PRN
Status: DISCONTINUED | OUTPATIENT
Start: 2017-12-19 | End: 2017-12-20 | Stop reason: HOSPADM

## 2017-12-19 RX ORDER — IBUPROFEN 600 MG/1
600 TABLET ORAL EVERY 6 HOURS
Status: DISCONTINUED | OUTPATIENT
Start: 2017-12-19 | End: 2017-12-20 | Stop reason: HOSPADM

## 2017-12-19 RX ORDER — DOCUSATE SODIUM 100 MG/1
200 CAPSULE, LIQUID FILLED ORAL 2 TIMES DAILY PRN
Status: DISCONTINUED | OUTPATIENT
Start: 2017-12-19 | End: 2017-12-20 | Stop reason: HOSPADM

## 2017-12-19 RX ORDER — DIPHENHYDRAMINE HYDROCHLORIDE 50 MG/ML
25 INJECTION INTRAMUSCULAR; INTRAVENOUS EVERY 4 HOURS PRN
Status: DISCONTINUED | OUTPATIENT
Start: 2017-12-19 | End: 2017-12-20 | Stop reason: HOSPADM

## 2017-12-19 RX ORDER — OXYTOCIN/RINGER'S LACTATE 20/1000 ML
41.65 PLASTIC BAG, INJECTION (ML) INTRAVENOUS CONTINUOUS
Status: ACTIVE | OUTPATIENT
Start: 2017-12-19 | End: 2017-12-19

## 2017-12-19 RX ORDER — OXYCODONE AND ACETAMINOPHEN 10; 325 MG/1; MG/1
1 TABLET ORAL EVERY 4 HOURS PRN
Status: DISCONTINUED | OUTPATIENT
Start: 2017-12-19 | End: 2017-12-20 | Stop reason: HOSPADM

## 2017-12-19 RX ORDER — OXYCODONE AND ACETAMINOPHEN 5; 325 MG/1; MG/1
1 TABLET ORAL EVERY 4 HOURS PRN
Status: DISCONTINUED | OUTPATIENT
Start: 2017-12-19 | End: 2017-12-20 | Stop reason: HOSPADM

## 2017-12-19 RX ORDER — ONDANSETRON 8 MG/1
8 TABLET, ORALLY DISINTEGRATING ORAL EVERY 8 HOURS PRN
Status: DISCONTINUED | OUTPATIENT
Start: 2017-12-19 | End: 2017-12-20 | Stop reason: HOSPADM

## 2017-12-19 RX ORDER — ACETAMINOPHEN 325 MG/1
650 TABLET ORAL EVERY 6 HOURS PRN
Status: DISCONTINUED | OUTPATIENT
Start: 2017-12-19 | End: 2017-12-20 | Stop reason: HOSPADM

## 2017-12-19 RX ADMIN — IBUPROFEN 600 MG: 600 TABLET, FILM COATED ORAL at 11:12

## 2017-12-19 RX ADMIN — IBUPROFEN 600 MG: 600 TABLET, FILM COATED ORAL at 05:12

## 2017-12-19 RX ADMIN — IBUPROFEN 600 MG: 600 TABLET, FILM COATED ORAL at 06:12

## 2017-12-19 NOTE — PROGRESS NOTES
"[facility]  Obstetrics  Postpartum Progress Note    Patient Name: Gillian Bains  MRN: [id number]  Admission Date: 12/18/2017  Hospital Length of Stay: 1 days  Attending Physician: González Scott III, MD  Primary Care Provider: Jon Andersen NP    Subjective:     Principal Problem:Status post vacuum-assisted vaginal delivery    Hospital course: 12/18/2017 - Patient admitted for expectant labor management.  12/19/2017 - PPD #1 s/p VAVD of male infant. Patient with Hep C, tobacco use during pregnancy, and urine positive for opiates. She is doing well this morning from a PP standpoint. She was asked about opiate use and says she took percocet occasionally ("left over from something else") for pregnancy-related pain. She denies other drug use. She is bottle feeding, and desires circumcision.    Interval History:     She is doing well this morning. She is tolerating a regular diet without nausea or vomiting. She is voiding spontaneously. She is ambulating. She has passed flatus, and has not a BM. Vaginal bleeding is mild. She denies fever or chills. Abdominal pain is mild and controlled with oral medications. She is not breastfeeding. She desires circumcision for her male baby: yes.    Objective:     Vital Signs (Most Recent):  Temp: 98.4 °F (36.9 °C) (12/19/17 0217)  Pulse: 81 (12/19/17 0217)  Resp: 18 (12/19/17 0217)  BP: 127/66 (12/19/17 0217)  SpO2: 97 % (12/19/17 0217) Vital Signs (24h Range):  Temp:  [97 °F (36.1 °C)-98.4 °F (36.9 °C)] 98.4 °F (36.9 °C)  Pulse:  [] 81  Resp:  [18] 18  SpO2:  [97 %-100 %] 97 %  BP: ()/(50-79) 127/66     Weight: 62.6 kg (138 lb)  Body mass index is 26.07 kg/m².      Intake/Output Summary (Last 24 hours) at 12/19/17 0647  Last data filed at 12/19/17 0618   Gross per 24 hour   Intake             1240 ml   Output             1405 ml   Net             -165 ml       Significant Labs:  Lab Results   Component Value Date    [facility] LILI POS 12/18/2017 " "      Recent Labs  Lab 17  1340   HGB 12.5   HCT 36.2*     I have personallly reviewed all pertinent lab results from the last 24 hours.    Physical Exam:   Constitutional: She is oriented to person, place, and time. She appears well-developed and well-nourished. No distress.    HENT:   Head: Normocephalic and atraumatic.    Eyes: Conjunctivae and EOM are normal.    Neck: Normal range of motion.    Cardiovascular: Normal rate and regular rhythm.     Pulmonary/Chest: Effort normal and breath sounds normal.        Abdominal: Soft. Bowel sounds are normal. She exhibits no distension. There is tenderness (mild). There is no rebound and no guarding.   Uterine fundus firm and just below the umbilicus.             Musculoskeletal: Normal range of motion and moves all extremeties.       Neurological: She is alert and oriented to person, place, and time.    Skin: Skin is warm and dry. She is not diaphoretic.    Psychiatric: She has a normal mood and affect. Her behavior is normal.       Assessment/Plan:     32 y.o. female  for:    * Status post vacuum-assisted vaginal delivery    Postpartum care:  - Patient doing well. Continue routine management and advances.  - Continue PO pain meds. Pain well controlled.  - Encourage ambulation.   - Heme: Pre Delivery h/h  --> Post Delivery h/h pending  - Circumcision - Consents signed and order placed   - Contraception - ASK prior to discharge  - Lactation - The patient is bottle feeding. Lactation nurse following along PRN  - Rh Status - POS        Pregnancy complicated by maternal drug use, delivered, current hospitalization    - Utox positive for opioids  - Discussed with anesthesia - utox not attributable to medications related to epidural anesthesia  - Per patient, she occasionally took Percocet "left over" from a prescription given prior to pregnancy  - Social work consult ordered        Tobacco use during pregnancy, delivered    - Patient counseled on smoking " cessation            Disposition: As patient meets milestones, will plan to discharge on PPD #2.    Blaise Shahid MD  Obstetrics  [facility]

## 2017-12-19 NOTE — L&D DELIVERY NOTE
VAVD cephalic after approximately 40 minutes of maternal pushing. Vacuum applied 2/2 to maternal exhaustion and inadequate expulsive efforts. No pop offs, 2 pulls in total. Outlet extraction. Vacuum removed at vaginal introitus.   Under epidural anesthesia.  Infant delivered SEGUNDO over intact perineum.  Male infant also tolerated the delivery well and was placed on mothers abdomen for skin to skin and bulb suctioning performed.  Cord clamped and cut.  Umbilical arterial gas and venous blood obtained.  Posterior vaginal abrasion found to be hemostatic.  Placenta delivered spontaneously and IV pitocin given.  Uterine tone noted. No cervical lacerations.  Patient tolerated delivery well.  EBL 150cc  Staff present for entire procedure.  S/L/N counts correct x2.    Delivery Information for  Vikas Bains    Birth information:  YOB: 2018   Time of birth: 8:50 PM   Sex: male   Head Delivery Date/Time: 2017  8:50 PM   Delivery type: Vaginal, Spontaneous Delivery   Gestational Age: 38w3d    Delivery Providers    Delivering clinician:  Janine Parada MD   Other personnel:   Provider Role   Kaila Lees MD Resident   Adamaris Cotton RN Registered Nurse   Amanda Kat RN Charge Nurse   Dhara Reyes, RN Registered Nurse   Anne ARIAS [facility]               [place] Measurements    Weight:  2900 g Length:  48.3 cm   Head circum.:  31.8 cm Chest circum.:  32.4 cm          [place] Assessment    Living status:  Living  Apgars:     1 Minute:   5 Minute:   10 Minute:   15 Minute:   20 Minute:     Skin Color:   0  1       Heart Rate:   2  2       Reflex Irritability:   2  2       Muscle Tone:   2  2       Respiratory Effort:   2  2       Total:   8  9               Apgars Assigned By:  DHARA REYES RN         Assisted Delivery Details:    Forceps attempted?:  No  Vacuum extractor attempted?:  Yes  Vacuum indications:  Maternal Fatigue  Vacuum type:  Kiwi  Vacuum  application location:  Outlet  First attempt time vacuum applied:  2017  First attempt time vacuum removed:  2017  Number of pop offs:  0  Number of pulls with vacuum:  2  Total vacuum application time:  15 seconds  Vacuum applied by:  SARIAH ARAIZA MD  Failed vacuum delivery?:  No         Shoulder Dystocia    Shoulder dystocia present?:  No           Presentation and Position    Presentation:   Vertex   Position:   Left    Occiput    Anterior            Interventions/Resuscitation    Method:  Bulb Suctioning, Tactile Stimulation       Cord    Vessels:  3 vessels  Complications:  None  Delayed Cord Clamping?:  No  Cord Clamped Date/Time:  2017  8:50 PM  Cord Blood Disposition:  Sent with Baby  Gases Sent?:  Yes  Stem Cell Collection (by MD):  No       Placenta    Date and time:  2017  8:53 PM  Removal:  Spontaneous  Appearance:  Intact  Placenta disposition:  discarded           Labor Events:       labor: No     Labor Onset Date/Time:         Dilation Complete Date/Time:         Start Pushing Date/Time:       Rupture Date/Time:              Rupture type:           Fluid Amount:        Fluid Color:        Fluid Odor:        Membrane Status (PeriCalm): SRM (Spontaneous Rupture)      Rupture Date/Time (PeriCalm): 2017 15:29:00      Fluid Amount (PeriCalm): Moderate      Fluid Color (PeriCalm): Bloody       steroids: None     Antibiotics given for GBS: No     Induction: none     Indications for induction:        Augmentation: amniotomy     Indications for augmentation: Ineffective Contraction Pattern     Labor complications: None     Additional complications:          Cervical ripening:                     Delivery:      Episiotomy: None     Indication for Episiotomy:       Perineal Lacerations: None Repaired:      Periurethral Laceration: none Repaired:     Labial Laceration: none Repaired:     Sulcus Laceration: none Repaired:     Vaginal Laceration: No Repaired:      Cervical Laceration: No Repaired:     Repair suture: None     Repair # of packets: 0     Vaginal delivery QBL (mL): 100      QBL (mL): 0     Combined Blood Loss (mL): 100     Vaginal Sweep Performed: Yes     Surgicount Correct:         Other providers:       Anesthesia    Method:  Epidural          Details (if applicable):  Trial of Labor      Categorization:      Priority:     Indications for :     Incision Type:       Additional  information:  Forceps:    Vacuum:    Breech:    Observed anomalies    Other (Comments):

## 2017-12-19 NOTE — ASSESSMENT & PLAN NOTE
"- Utox positive for opioids  - Discussed with anesthesia - utox not attributable to medications related to epidural anesthesia  - Per patient, she occasionally took Percocet "left over" from a prescription given prior to pregnancy  - Social work consult ordered  "

## 2017-12-19 NOTE — ASSESSMENT & PLAN NOTE
Postpartum care:  - Patient doing well. Continue routine management and advances.  - Continue PO pain meds. Pain well controlled.  - Encourage ambulation.   - Heme: Pre Delivery h/h 12/36 --> Post Delivery h/h pending  - Circumcision - Consents signed and order placed   - Contraception - ASK prior to discharge  - Lactation - The patient is bottle feeding. Lactation nurse following along PRN  - Rh Status - POS

## 2017-12-19 NOTE — ANESTHESIA POSTPROCEDURE EVALUATION
"Anesthesia Post Evaluation    Patient: Gillian Bains    Procedure(s) Performed: * No procedures listed *    Final Anesthesia Type: epidural  Patient location during evaluation: labor & delivery  Patient participation: Yes- Able to Participate  Level of consciousness: awake and alert and oriented  Post-procedure vital signs: reviewed and stable  Pain management: adequate  Airway patency: patent  PONV status at discharge: No PONV  Anesthetic complications: no      Cardiovascular status: blood pressure returned to baseline, hemodynamically stable and stable  Respiratory status: unassisted, spontaneous ventilation and room air  Hydration status: euvolemic  Follow-up not needed.        Visit Vitals  /62   Pulse 93   Temp 36.5 °C (97.7 °F) (Oral)   Resp 18   Ht 5' 1" (1.549 m)   Wt 62.6 kg (138 lb)   SpO2 97%   Breastfeeding? Unknown   BMI 26.07 kg/m²       Pain/Rose Mary Score: Pain Rating Prior to Med Admin: 3 (12/19/2017 11:31 AM)  Pain Rating Post Med Admin: 0 (12/19/2017 12:00 AM)      "

## 2017-12-19 NOTE — PLAN OF CARE
Met with pt after consult ordered for utox +opiates.    Introduced self and explained sw role. Pt was engaged in visit but reported she was tired from not sleeping last night. Pt is looking forward to quiet time so she can get some rest and her visitors can leave. Pt reported she received prenatal care in [facility] by Dr. Andersen. Pt was given the option to deliver at [facility] and chose [facility]. Pt did receive [facility] care with [facility]. While discussing utox +opiates, pt admitted to using 1/2 of a percocet Friday, Saturday and Sunday prior to delivery to alleviate back pain. Pt was given tylenol 3 by Dr. Andersen but said it was not helpful. Pt did not report where she received the percocet from. Pt reported there should be no concern in regards to drug withdrawal for baby. Pt denied any other drug use during pregnancy or h/o drug use.    Pt reported being very prepared at home for baby. Pt hospital room had various gifts for baby as well.     Baby's utox was negative. Meconium is pending. DCFS report will be based on meconium results.    Will cont to f/u.     Baby's d/c should not be held while awaiting meconium results.      Melonie Nam LCSW    [facility]  [email or web address]    (phone) [phone] or  [address]. [phone]  (fax) [phone]

## 2017-12-19 NOTE — SUBJECTIVE & OBJECTIVE
"Hospital course: 12/18/2017 - Patient admitted for expectant labor management.  12/19/2017 - PPD #1 s/p VAVD of male infant. Patient with Hep C, tobacco use during pregnancy, and urine positive for opiates. She is doing well this morning from a PP standpoint. She was asked about opiate use and says she took percocet occasionally ("left over from something else") for pregnancy-related pain. She denies other drug use. She is bottle feeding, and desires circumcision.    Interval History:     She is doing well this morning. She is tolerating a regular diet without nausea or vomiting. She is voiding spontaneously. She is ambulating. She has passed flatus, and has not a BM. Vaginal bleeding is mild. She denies fever or chills. Abdominal pain is mild and controlled with oral medications. She is not breastfeeding. She desires circumcision for her male baby: yes.    Objective:     Vital Signs (Most Recent):  Temp: 98.4 °F (36.9 °C) (12/19/17 0217)  Pulse: 81 (12/19/17 0217)  Resp: 18 (12/19/17 0217)  BP: 127/66 (12/19/17 0217)  SpO2: 97 % (12/19/17 0217) Vital Signs (24h Range):  Temp:  [97 °F (36.1 °C)-98.4 °F (36.9 °C)] 98.4 °F (36.9 °C)  Pulse:  [] 81  Resp:  [18] 18  SpO2:  [97 %-100 %] 97 %  BP: ()/(50-79) 127/66     Weight: 62.6 kg (138 lb)  Body mass index is 26.07 kg/m².      Intake/Output Summary (Last 24 hours) at 12/19/17 0658  Last data filed at 12/19/17 0618   Gross per 24 hour   Intake             1240 ml   Output             1405 ml   Net             -165 ml       Significant Labs:  Lab Results   Component Value Date    GROUPTRH A POS 12/18/2017       Recent Labs  Lab 12/18/17  1340   HGB 12.5   HCT 36.2*     I have personallly reviewed all pertinent lab results from the last 24 hours.    Physical Exam:   Constitutional: She is oriented to person, place, and time. She appears well-developed and well-nourished. No distress.    HENT:   Head: Normocephalic and atraumatic.    Eyes: Conjunctivae and EOM " are normal.    Neck: Normal range of motion.    Cardiovascular: Normal rate and regular rhythm.     Pulmonary/Chest: Effort normal and breath sounds normal.        Abdominal: Soft. Bowel sounds are normal. She exhibits no distension. There is tenderness (mild). There is no rebound and no guarding.   Uterine fundus firm and just below the umbilicus.             Musculoskeletal: Normal range of motion and moves all extremeties.       Neurological: She is alert and oriented to person, place, and time.    Skin: Skin is warm and dry. She is not diaphoretic.    Psychiatric: She has a normal mood and affect. Her behavior is normal.

## 2017-12-20 VITALS
BODY MASS INDEX: 26.06 KG/M2 | HEART RATE: 76 BPM | HEIGHT: 61 IN | TEMPERATURE: 98 F | SYSTOLIC BLOOD PRESSURE: 91 MMHG | OXYGEN SATURATION: 98 % | DIASTOLIC BLOOD PRESSURE: 55 MMHG | RESPIRATION RATE: 18 BRPM | WEIGHT: 138 LBS

## 2017-12-20 PROCEDURE — 63600175 PHARM REV CODE 636 W HCPCS: Performed by: STUDENT IN AN ORGANIZED HEALTH CARE EDUCATION/TRAINING PROGRAM

## 2017-12-20 PROCEDURE — 90472 IMMUNIZATION ADMIN EACH ADD: CPT | Performed by: STUDENT IN AN ORGANIZED HEALTH CARE EDUCATION/TRAINING PROGRAM

## 2017-12-20 PROCEDURE — 90715 TDAP VACCINE 7 YRS/> IM: CPT | Performed by: STUDENT IN AN ORGANIZED HEALTH CARE EDUCATION/TRAINING PROGRAM

## 2017-12-20 PROCEDURE — 90710 MMRV VACCINE SC: CPT | Performed by: STUDENT IN AN ORGANIZED HEALTH CARE EDUCATION/TRAINING PROGRAM

## 2017-12-20 PROCEDURE — 10907ZC DRAINAGE OF AMNIOTIC FLUID, THERAPEUTIC FROM PRODUCTS OF CONCEPTION, VIA NATURAL OR ARTIFICIAL OPENING: ICD-10-PCS | Performed by: OBSTETRICS & GYNECOLOGY

## 2017-12-20 PROCEDURE — 99238 HOSP IP/OBS DSCHRG MGMT 30/<: CPT | Mod: ,,, | Performed by: OBSTETRICS & GYNECOLOGY

## 2017-12-20 PROCEDURE — 3E0234Z INTRODUCTION OF SERUM, TOXOID AND VACCINE INTO MUSCLE, PERCUTANEOUS APPROACH: ICD-10-PCS | Performed by: OBSTETRICS & GYNECOLOGY

## 2017-12-20 PROCEDURE — 90471 IMMUNIZATION ADMIN: CPT | Performed by: STUDENT IN AN ORGANIZED HEALTH CARE EDUCATION/TRAINING PROGRAM

## 2017-12-20 PROCEDURE — 25000003 PHARM REV CODE 250: Performed by: STUDENT IN AN ORGANIZED HEALTH CARE EDUCATION/TRAINING PROGRAM

## 2017-12-20 RX ORDER — MEDROXYPROGESTERONE ACETATE 150 MG/ML
150 INJECTION, SUSPENSION INTRAMUSCULAR ONCE
Status: COMPLETED | OUTPATIENT
Start: 2017-12-20 | End: 2017-12-20

## 2017-12-20 RX ORDER — IBUPROFEN 600 MG/1
600 TABLET ORAL EVERY 6 HOURS
Qty: 30 TABLET | Refills: 1 | Status: SHIPPED | OUTPATIENT
Start: 2017-12-20 | End: 2018-08-13

## 2017-12-20 RX ADMIN — IBUPROFEN 600 MG: 600 TABLET, FILM COATED ORAL at 02:12

## 2017-12-20 RX ADMIN — MEDROXYPROGESTERONE ACETATE 150 MG: 150 INJECTION, SUSPENSION INTRAMUSCULAR at 12:12

## 2017-12-20 RX ADMIN — CLOSTRIDIUM TETANI TOXOID ANTIGEN (FORMALDEHYDE INACTIVATED), CORYNEBACTERIUM DIPHTHERIAE TOXOID ANTIGEN (FORMALDEHYDE INACTIVATED), BORDETELLA PERTUSSIS TOXOID ANTIGEN (GLUTARALDEHYDE INACTIVATED), BORDETELLA PERTUSSIS FILAMENTOUS HEMAGGLUTININ ANTIGEN (FORMALDEHYDE INACTIVATED), BORDETELLA PERTUSSIS PERTACTIN ANTIGEN, AND BORDETELLA PERTUSSIS FIMBRIAE 2/3 ANTIGEN 0.5 ML: 5; 2; 2.5; 5; 3; 5 INJECTION, SUSPENSION INTRAMUSCULAR at 12:12

## 2017-12-20 RX ADMIN — MEASLES, MUMPS, AND RUBELLA VIRUS VACCINE LIVE 0.5 ML: 1000; 12500; 1000 INJECTION, POWDER, LYOPHILIZED, FOR SUSPENSION SUBCUTANEOUS at 12:12

## 2017-12-20 RX ADMIN — IBUPROFEN 600 MG: 600 TABLET, FILM COATED ORAL at 12:12

## 2017-12-20 NOTE — ASSESSMENT & PLAN NOTE
Postpartum care:  - Patient doing well. Continue routine management and advances.  - Continue PO pain meds. Pain well controlled.  - Encourage ambulation.   - Heme: Pre Delivery h/h 12/36 --> Post Delivery h/h 11/33  - Circumcision - Consents signed and order placed   - Contraception - desires nexplanon. Will get depo prior to discharge to cover until device is placed  - Lactation - The patient is bottle feeding. Lactation nurse following along PRN  - Rh Status - POS

## 2017-12-20 NOTE — ASSESSMENT & PLAN NOTE
"- Utox positive for opioids  - Discussed with anesthesia - utox not attributable to medications related to epidural anesthesia  - Per patient, she occasionally took Percocet "left over" from a prescription given prior to pregnancy  - Social work consult completed: ok to dc patient and infant  - infant utox negative, meconium pending  "

## 2017-12-20 NOTE — DISCHARGE SUMMARY
"[facility]  Obstetrics  Discharge Summary      Patient Name: Gillian Bains  MRN: [id number]  Admission Date: 2017  Hospital Length of Stay: 2 days  Discharge Date and Time:  2017 7:10 AM  Attending Physician: González [place] III, MD   Discharging Provider: Tj Jay MD  Primary Care Provider: Jon Andersen NP    HPI: Gillian Bains is a 32 y.o.  at 38w3d who presents complaining of painful uterine contractions since 3:30 AM.  The patient states that they have increased in intensity and frequency since that time. She denies any leaking fluids or vaginal bleeding and states that fetal movements are active and persistent.     In the OB ED she was found to have a reactive strip and her cervical check was 490/-1.  Given her cervical dilation and painful contractions she was admitted to the floor for expectant labor management.     This IUP is complicated by daily tobacco use, maternal Hep C infection.       * No surgery found *     Hospital Course:   2017 - Patient admitted for expectant labor management.  2017 - PPD #1 s/p VAVD of male infant. Patient with Hep C, tobacco use during pregnancy, and urine positive for opiates. She is doing well this morning from a PP standpoint. She was asked about opiate use and says she took percocet occasionally ("left over from something else") for pregnancy-related pain. She denies other drug use. Infant's utox negative. She is bottle feeding, and desires circumcision.  2017 PPD#2 s/p VAVD, meeting all postpartum milestones, stable for dc.    Consults         Status Ordering Provider     Inpatient consult to Social Work  Once     Provider:  (Not yet assigned)    Completed BENEDICTO ARAIZA          Final Active Diagnoses:    Diagnosis Date Noted POA    PRINCIPAL PROBLEM:  Status post vacuum-assisted vaginal delivery [Z87.42] 2017 Not Applicable    Pregnancy complicated by tobacco use in third trimester [O99.333] " 12/19/2017 Yes    Pregnancy complicated by maternal drug use, delivered, current hospitalization [O99.324, F19.90] 12/19/2017 Unknown      Problems Resolved During this Admission:    Diagnosis Date Noted Date Resolved POA    Vaginal delivery [O80] 12/19/2017 12/19/2017 Not Applicable    38 weeks gestation of pregnancy [Z3A.38]  12/20/2017 Not Applicable    Uterine contractions during pregnancy [O62.2] 12/18/2017 12/19/2017 Yes    Indication for care in labor and delivery, antepartum [O75.9] 12/18/2017 12/19/2017 Unknown        Labs:   CBC   Recent Labs  Lab 12/18/17  1340 12/19/17  0758   WBC 11.74 17.65*   HGB 12.5 11.4*   HCT 36.2* 33.9*   * 341       Feeding Method: bottle    Immunizations     Date Immunization Status Dose Route/Site Given by    12/19/17 0648 MMR Incomplete 0.5 mL Subcutaneous/Left deltoid     12/19/17 0648 Tdap Incomplete 0.5 mL Intramuscular/Left deltoid           Delivery:    Episiotomy: None   Lacerations: None   Repair suture: None   Repair # of packets: 0   Blood loss (ml): 100     Birth information:  YOB: 2018   Time of birth: 8:50 PM   Sex: male   Delivery type: Vaginal, Spontaneous Delivery   Gestational Age: 38w3d    Delivery Clinician:      Other providers:       Additional  information:  Forceps:    Vacuum:    Breech:    Observed anomalies      Living?:           APGARS  One minute Five minutes Ten minutes   Skin color:         Heart rate:         Grimace:         Muscle tone:         Breathing:         Totals: 8  9        Placenta: Delivered:       appearance    Pending Diagnostic Studies:     None          Discharged Condition: good    Disposition: Home or Self Care    Follow Up:  Follow-up Information     Jon Andersen NP. Schedule an appointment as soon as possible for a visit in 6 weeks.    Specialty:  Gynecology  Why:  Post Partum Appointment: Call ASAP to arrange nexplanon placement (birth control implant)  Contact information:  [address] [facility]  [address]  [facility] [address]  [phone]                 Patient Instructions:     Diet general     Activity as tolerated     Other restrictions (specify):   Order Comments: Nothing in the vagina for 6 weeks     Call MD for:  temperature >100.4     Call MD for:  persistent nausea and vomiting or diarrhea     Call MD for:  severe uncontrolled pain     Call MD for:  difficulty breathing or increased cough     Call MD for:  severe persistent headache     Call MD for:  worsening rash     Call MD for:  persistent dizziness, light-headedness, or visual disturbances     Call MD for:  increased confusion or weakness     Call MD for:   Order Comments: --Vaginal bleeding more than 1 pad/hour for more than 2 hours  --Overwhelming feelings of anxiety or sadness greater than 50% of the time       Medications:  Current Discharge Medication List      START taking these medications    Details   ibuprofen (ADVIL,MOTRIN) 600 MG tablet Take 1 tablet (600 mg total) by mouth every 6 (six) hours.  Qty: 30 tablet, Refills: 1         CONTINUE these medications which have NOT CHANGED    Details   PREPLUS 27 mg iron- 1 mg Tab TK ONE T PO D  Refills: 0             Tj Jay MD  Obstetrics  [facility]

## 2017-12-20 NOTE — SUBJECTIVE & OBJECTIVE
"Hospital course: 12/18/2017 - Patient admitted for expectant labor management.  12/19/2017 - PPD #1 s/p VAVD of male infant. Patient with Hep C, tobacco use during pregnancy, and urine positive for opiates. She is doing well this morning from a PP standpoint. She was asked about opiate use and says she took percocet occasionally ("left over from something else") for pregnancy-related pain. She denies other drug use. Infant's utox negative. She is bottle feeding, and desires circumcision.  12/20/2017 PPD#2 s/p VAVD, meeting all postpartum milestones, stable for dc.    Interval History:     She is doing well this morning. She is tolerating a regular diet without nausea or vomiting. She is voiding spontaneously. She is ambulating. She has passed flatus, and has not had a BM. Vaginal bleeding is mild. She denies fever or chills. Abdominal pain is mild and controlled with oral medications. She is not breastfeeding. She desires circumcision for her male baby: yes.    Objective:     Vital Signs (Most Recent):  Temp: 98 °F (36.7 °C) (12/20/17 0000)  Pulse: 75 (12/20/17 0000)  Resp: 18 (12/20/17 0000)  BP: (!) 105/58 (12/20/17 0000)  SpO2: 100 % (12/20/17 0000) Vital Signs (24h Range):  Temp:  [97.7 °F (36.5 °C)-98 °F (36.7 °C)] 98 °F (36.7 °C)  Pulse:  [75-93] 75  Resp:  [18] 18  SpO2:  [97 %-100 %] 100 %  BP: (103-105)/(58-62) 105/58     Weight: 62.6 kg (138 lb)  Body mass index is 26.07 kg/m².      Intake/Output Summary (Last 24 hours) at 12/20/17 0695  Last data filed at 12/19/17 2200   Gross per 24 hour   Intake              500 ml   Output                0 ml   Net              500 ml       Significant Labs:  Lab Results   Component Value Date    GROUPTRH A POS 12/18/2017       Recent Labs  Lab 12/19/17  0758   HGB 11.4*   HCT 33.9*     I have personallly reviewed all pertinent lab results from the last 24 hours.    Physical Exam:   Constitutional: She is oriented to person, place, and time. She appears well-developed " and well-nourished. No distress.    HENT:   Head: Normocephalic and atraumatic.       Pulmonary/Chest: Effort normal. No respiratory distress.        Abdominal: Soft. She exhibits no distension. There is no tenderness. There is no rebound and no guarding.   Uterine fundus firm and just below the umbilicus.             Musculoskeletal: Moves all extremeties.       Neurological: She is alert and oriented to person, place, and time.    Skin: Skin is warm and dry. She is not diaphoretic.    Psychiatric: She has a normal mood and affect. Her behavior is normal.

## 2017-12-20 NOTE — PROGRESS NOTES
"[facility]  Obstetrics  Postpartum Progress Note    Patient Name: Gillian Bains  MRN: [id number]  Admission Date: 12/18/2017  Hospital Length of Stay: 2 days  Attending Physician: González [place] III, MD  Primary Care Provider: Jon Andersen NP    Subjective:     Principal Problem:Status post vacuum-assisted vaginal delivery    Hospital course: 12/18/2017 - Patient admitted for expectant labor management.  12/19/2017 - PPD #1 s/p VAVD of male infant. Patient with Hep C, tobacco use during pregnancy, and urine positive for opiates. She is doing well this morning from a PP standpoint. She was asked about opiate use and says she took percocet occasionally ("left over from something else") for pregnancy-related pain. She denies other drug use. Infant's utox negative. She is bottle feeding, and desires circumcision.  12/20/2017 PPD#2 s/p VAVD, meeting all postpartum milestones, stable for dc.    Interval History:     She is doing well this morning. She is tolerating a regular diet without nausea or vomiting. She is voiding spontaneously. She is ambulating. She has passed flatus, and has not had a BM. Vaginal bleeding is mild. She denies fever or chills. Abdominal pain is mild and controlled with oral medications. She is not breastfeeding. She desires circumcision for her male baby: yes.    Objective:     Vital Signs (Most Recent):  Temp: 98 °F (36.7 °C) (12/20/17 0000)  Pulse: 75 (12/20/17 0000)  Resp: 18 (12/20/17 0000)  BP: (!) 105/58 (12/20/17 0000)  SpO2: 100 % (12/20/17 0000) Vital Signs (24h Range):  Temp:  [97.7 °F (36.5 °C)-98 °F (36.7 °C)] 98 °F (36.7 °C)  Pulse:  [75-93] 75  Resp:  [18] 18  SpO2:  [97 %-100 %] 100 %  BP: (103-105)/(58-62) 105/58     Weight: 62.6 kg (138 lb)  Body mass index is 26.07 kg/m².      Intake/Output Summary (Last 24 hours) at 12/20/17 0625  Last data filed at 12/19/17 2200   Gross per 24 hour   Intake              500 ml   Output                0 ml   Net             " " 500 ml       Significant Labs:  Lab Results   Component Value Date    ALY PEARSON POS 2017       Recent Labs  Lab 17  0758   HGB 11.4*   HCT 33.9*     I have personallly reviewed all pertinent lab results from the last 24 hours.    Physical Exam:   Constitutional: She is oriented to person, place, and time. She appears well-developed and well-nourished. No distress.    HENT:   Head: Normocephalic and atraumatic.       Pulmonary/Chest: Effort normal. No respiratory distress.        Abdominal: Soft. She exhibits no distension. There is no tenderness. There is no rebound and no guarding.   Uterine fundus firm and just below the umbilicus.             Musculoskeletal: Moves all extremeties.       Neurological: She is alert and oriented to person, place, and time.    Skin: Skin is warm and dry. She is not diaphoretic.    Psychiatric: She has a normal mood and affect. Her behavior is normal.       Assessment/Plan:     32 y.o. female  for:    * Status post vacuum-assisted vaginal delivery    Postpartum care:  - Patient doing well. Continue routine management and advances.  - Continue PO pain meds. Pain well controlled.  - Encourage ambulation.   - Heme: Pre Delivery h/h  --> Post Delivery h/h   - Circumcision - Consents signed and order placed   - Contraception - desires nexplanon. Will get depo prior to discharge to cover until device is placed  - Lactation - The patient is bottle feeding. Lactation nurse following along PRN  - Rh Status - POS        Pregnancy complicated by maternal drug use, delivered, current hospitalization    - Utox positive for opioids  - Discussed with anesthesia - utox not attributable to medications related to epidural anesthesia  - Per patient, she occasionally took Percocet "left over" from a prescription given prior to pregnancy  - Social work consult completed: ok to dc patient and infant  - infant utox negative, meconium pending        Pregnancy complicated by " tobacco use in third trimester    - Patient counseled on smoking cessation            Disposition: As patient meets milestones, will plan to discharge today.    Tj Jay MD  Obstetrics  [facility]

## 2018-02-05 ENCOUNTER — TELEPHONE (OUTPATIENT)
Dept: OBSTETRICS AND GYNECOLOGY | Facility: CLINIC | Age: 33
End: 2018-02-05

## 2018-02-05 NOTE — TELEPHONE ENCOUNTER
Called pt no answer left v/m for pt to call office to reschedule apt at the [place] location 2/6/2018
